# Patient Record
Sex: FEMALE | Race: WHITE
[De-identification: names, ages, dates, MRNs, and addresses within clinical notes are randomized per-mention and may not be internally consistent; named-entity substitution may affect disease eponyms.]

---

## 2017-04-19 ENCOUNTER — HOSPITAL ENCOUNTER (OUTPATIENT)
Age: 67
Discharge: HOME | End: 2017-04-19
Payer: MEDICARE

## 2017-04-19 DIAGNOSIS — R19.4: Primary | ICD-10-CM

## 2017-04-19 DIAGNOSIS — I10: ICD-10-CM

## 2017-04-19 DIAGNOSIS — D12.0: ICD-10-CM

## 2017-04-19 DIAGNOSIS — K21.9: ICD-10-CM

## 2017-04-19 PROCEDURE — 0DBH8ZX EXCISION OF CECUM, VIA NATURAL OR ARTIFICIAL OPENING ENDOSCOPIC, DIAGNOSTIC: ICD-10-PCS | Performed by: INTERNAL MEDICINE

## 2017-04-19 PROCEDURE — 45385 COLONOSCOPY W/LESION REMOVAL: CPT

## 2017-07-05 ENCOUNTER — HOSPITAL ENCOUNTER (OUTPATIENT)
Dept: HOSPITAL 76 - SDS | Age: 67
Discharge: HOME | End: 2017-07-05
Attending: INTERNAL MEDICINE
Payer: MEDICARE

## 2017-07-05 VITALS — DIASTOLIC BLOOD PRESSURE: 51 MMHG | SYSTOLIC BLOOD PRESSURE: 98 MMHG

## 2017-07-05 DIAGNOSIS — R12: ICD-10-CM

## 2017-07-05 DIAGNOSIS — Z86.010: ICD-10-CM

## 2017-07-05 DIAGNOSIS — K59.09: ICD-10-CM

## 2017-07-05 DIAGNOSIS — K44.9: ICD-10-CM

## 2017-07-05 DIAGNOSIS — I10: ICD-10-CM

## 2017-07-05 DIAGNOSIS — R13.14: Primary | ICD-10-CM

## 2017-07-05 PROCEDURE — 88305 TISSUE EXAM BY PATHOLOGIST: CPT

## 2017-07-05 PROCEDURE — 0DB68ZX EXCISION OF STOMACH, VIA NATURAL OR ARTIFICIAL OPENING ENDOSCOPIC, DIAGNOSTIC: ICD-10-PCS | Performed by: INTERNAL MEDICINE

## 2017-07-05 PROCEDURE — 43239 EGD BIOPSY SINGLE/MULTIPLE: CPT

## 2017-07-05 PROCEDURE — 0DB58ZX EXCISION OF ESOPHAGUS, VIA NATURAL OR ARTIFICIAL OPENING ENDOSCOPIC, DIAGNOSTIC: ICD-10-PCS | Performed by: INTERNAL MEDICINE

## 2018-04-19 ENCOUNTER — HOSPITAL ENCOUNTER (EMERGENCY)
Dept: HOSPITAL 76 - ED | Age: 68
Discharge: HOME | End: 2018-04-19
Payer: MEDICARE

## 2018-04-19 VITALS — DIASTOLIC BLOOD PRESSURE: 74 MMHG | SYSTOLIC BLOOD PRESSURE: 131 MMHG

## 2018-04-19 DIAGNOSIS — K59.01: Primary | ICD-10-CM

## 2018-04-19 DIAGNOSIS — I10: ICD-10-CM

## 2018-04-19 DIAGNOSIS — K21.9: ICD-10-CM

## 2018-04-19 LAB
ALBUMIN DIAFP-MCNC: 4.4 G/DL (ref 3.2–5.5)
ALBUMIN/GLOB SERPL: 1.8 {RATIO} (ref 1–2.2)
ALP SERPL-CCNC: 45 IU/L (ref 42–121)
ALT SERPL W P-5'-P-CCNC: 19 IU/L (ref 10–60)
ANION GAP SERPL CALCULATED.4IONS-SCNC: 7 MMOL/L (ref 6–13)
AST SERPL W P-5'-P-CCNC: 21 IU/L (ref 10–42)
BASOPHILS NFR BLD AUTO: 0.1 10^3/UL (ref 0–0.1)
BASOPHILS NFR BLD AUTO: 1 %
BILIRUB BLD-MCNC: 0.8 MG/DL (ref 0.2–1)
BUN SERPL-MCNC: 8 MG/DL (ref 6–20)
CALCIUM UR-MCNC: 8.9 MG/DL (ref 8.5–10.3)
CHLORIDE SERPL-SCNC: 98 MMOL/L (ref 101–111)
CLARITY UR REFRACT.AUTO: CLEAR
CO2 SERPL-SCNC: 31 MMOL/L (ref 21–32)
CREAT SERPLBLD-SCNC: 0.4 MG/DL (ref 0.4–1)
EOSINOPHIL # BLD AUTO: 0.1 10^3/UL (ref 0–0.7)
EOSINOPHIL NFR BLD AUTO: 2.4 %
ERYTHROCYTE [DISTWIDTH] IN BLOOD BY AUTOMATED COUNT: 14.3 % (ref 12–15)
GFRSERPLBLD MDRD-ARVRAT: 160 ML/MIN/{1.73_M2} (ref 89–?)
GLOBULIN SER-MCNC: 2.5 G/DL (ref 2.1–4.2)
GLUCOSE SERPL-MCNC: 95 MG/DL (ref 70–100)
GLUCOSE UR QL STRIP.AUTO: NEGATIVE MG/DL
HGB UR QL STRIP: 15.5 G/DL (ref 12–16)
KETONES UR QL STRIP.AUTO: 15 MG/DL
LIPASE SERPL-CCNC: 26 U/L (ref 22–51)
LYMPHOCYTES # SPEC AUTO: 1.7 10^3/UL (ref 1.5–3.5)
LYMPHOCYTES NFR BLD AUTO: 31.4 %
MAGNESIUM SERPL-MCNC: 2 MG/DL (ref 1.7–2.8)
MCH RBC QN AUTO: 28.7 PG (ref 27–31)
MCHC RBC AUTO-ENTMCNC: 34.6 G/DL (ref 32–36)
MCV RBC AUTO: 82.9 FL (ref 81–99)
MONOCYTES # BLD AUTO: 0.4 10^3/UL (ref 0–1)
MONOCYTES NFR BLD AUTO: 7.4 %
NEUTROPHILS # BLD AUTO: 3.1 10^3/UL (ref 1.5–6.6)
NEUTROPHILS # SNV AUTO: 5.3 X10^3/UL (ref 4.8–10.8)
NEUTROPHILS NFR BLD AUTO: 57.8 %
NITRITE UR QL STRIP.AUTO: NEGATIVE
PDW BLD AUTO: 7.4 FL (ref 7.9–10.8)
PH UR STRIP.AUTO: 7 PH (ref 5–7.5)
PLATELET # BLD: 217 10^3/UL (ref 130–450)
PROT SPEC-MCNC: 6.9 G/DL (ref 6.7–8.2)
PROT UR STRIP.AUTO-MCNC: NEGATIVE MG/DL
RBC # UR STRIP.AUTO: NEGATIVE /UL
RBC MAR: 5.4 10^6/UL (ref 4.2–5.4)
SODIUM SERPLBLD-SCNC: 136 MMOL/L (ref 135–145)
SP GR UR STRIP.AUTO: 1.01 (ref 1–1.03)
UROBILINOGEN UR QL STRIP.AUTO: (no result) E.U./DL
UROBILINOGEN UR STRIP.AUTO-MCNC: NEGATIVE MG/DL

## 2018-04-19 PROCEDURE — 83735 ASSAY OF MAGNESIUM: CPT

## 2018-04-19 PROCEDURE — 96361 HYDRATE IV INFUSION ADD-ON: CPT

## 2018-04-19 PROCEDURE — 83690 ASSAY OF LIPASE: CPT

## 2018-04-19 PROCEDURE — 99284 EMERGENCY DEPT VISIT MOD MDM: CPT

## 2018-04-19 PROCEDURE — 85651 RBC SED RATE NONAUTOMATED: CPT

## 2018-04-19 PROCEDURE — 96375 TX/PRO/DX INJ NEW DRUG ADDON: CPT

## 2018-04-19 PROCEDURE — 81003 URINALYSIS AUTO W/O SCOPE: CPT

## 2018-04-19 PROCEDURE — 85025 COMPLETE CBC W/AUTO DIFF WBC: CPT

## 2018-04-19 PROCEDURE — 99283 EMERGENCY DEPT VISIT LOW MDM: CPT

## 2018-04-19 PROCEDURE — 74177 CT ABD & PELVIS W/CONTRAST: CPT

## 2018-04-19 PROCEDURE — 81001 URINALYSIS AUTO W/SCOPE: CPT

## 2018-04-19 PROCEDURE — 96374 THER/PROPH/DIAG INJ IV PUSH: CPT

## 2018-04-19 PROCEDURE — 36415 COLL VENOUS BLD VENIPUNCTURE: CPT

## 2018-04-19 PROCEDURE — 87086 URINE CULTURE/COLONY COUNT: CPT

## 2018-04-19 PROCEDURE — 80053 COMPREHEN METABOLIC PANEL: CPT

## 2018-04-19 NOTE — ED PHYSICIAN DOCUMENTATION
PD HPI ABD PAIN





- Stated complaint


Stated Complaint: ABDOMAL PX, NAUSEA





- Chief complaint


Chief Complaint: Abd Pain





- History obtained from


History obtained from: Patient





- History of Present Illness


Timing - onset: How many days ago (few)


Timing - duration: Days (few)


Timing - details: Gradual onset, Still present, Waxing and waning


Quality: Cramping, Aching


Location: Periumbilical, LLQ


Radiation: Lower back


Improved by: No: Eating


Worsened by: Moving.  No: Eating, Palpation


Associated symptoms: Constipation (tried Mag Citrate and fleets enema twice 

yesterday without stool output. Having more cramps today. History of 

constipation. Did not call her GI.).  No: Fever, Nausea, Vomiting, Diarrhea, 

Dysuria


Similar symptoms before: Diagnosis (constipation but usually not as bad as 

current cramping pains.)





Review of Systems


Constitutional: denies: Fever, Chills


Nose: denies: Rhinorrhea / runny nose, Congestion


Throat: denies: Sore throat


Cardiac: denies: Chest pain / pressure, Palpitations


Respiratory: denies: Dyspnea, Cough


GI: reports: Abdominal Pain, Constipation.  denies: Abdominal Swelling, Nausea, 

Vomiting, Diarrhea, Bloody / black stool


: denies: Dysuria, Frequency


Skin: denies: Rash, Lesions


Neurologic: reports: Generalized weakness.  denies: Focal weakness, Numbness





PD PAST MEDICAL HISTORY





- Past Medical History


Cardiovascular: Hypertension


Respiratory: None


Neuro: None


Endocrine/Autoimmune: None


GI: GERD, Chronic constipation


GYN: None


: None


HEENT: None


Psych: None


Musculoskeletal: None


Derm: Other





- Past Surgical History


Past Surgical History: Yes


General: Colonoscopy


Ortho: Rotator cuff repair


/GYN: Tubal ligation, Hysterectomy, Other





- Present Medications


Home Medications: 


 Ambulatory Orders











 Medication  Instructions  Recorded  Confirmed


 


hydroCHLOROthiazide 25 mg PO DAILY 11/12/16 07/05/17





[Hydrochlorothiazide]   


 


Ergocalciferol [Vitamin D2] 50,000 units PO OAW 04/18/17 07/05/17


 


Lubiprostone [Amitiza] 8 mcg PO BID 04/18/17 07/05/17


 


Potassium Chloride [K-Dur] 40 meq PO DAILY 04/18/17 06/28/17


 


Meclizine [Antivert] 1 cap PO PRN PRN 04/19/17 07/05/17


 


Promethazine [Phenergan] 1 cap PO PRN PRN 04/19/17 06/28/17


 


Omeprazole 40 mg PO DAILY 06/28/17 07/05/17


 


Dicyclomine [Bentyl] 10 mg PO QID PRN #10 capsule 04/19/18 


 


Lubiprostone [Amitiza]  04/19/18 


 


raNITIdine [Zantac]  04/19/18 














- Allergies


Allergies/Adverse Reactions: 


 Allergies











Allergy/AdvReac Type Severity Reaction Status Date / Time


 


No Known Drug Allergies Allergy   Verified 06/28/17 11:25














- Social History


Does the pt smoke?: No


Smoking Status: Never smoker


Does the pt drink ETOH?: No


Does the pt have substance abuse?: No





- Family History


Family history: reports: Non contributory





- Immunizations


Immunizations are current?: Yes





PD ED PE NORMAL





- Vitals


Vital signs reviewed: Yes





- General


General: Alert and oriented X 3, No acute distress, Well developed/nourished





- Neck


Neck: Supple, no meningeal sign, No adenopathy





- Cardiac


Cardiac: RRR, No murmur





- Respiratory


Respiratory: Clear bilaterally





- Abdomen


Abdomen: Soft, Non distended, No organomegaly, Other (mild tender left abdomen 

without percussion nor rebound. ).  No: Normal bowel sounds (increased and 

hyperactive)





- Female 


Female : Deferred





- Rectal


Rectal: Deferred





- Back


Back: No CVA TTP





- Derm


Derm: Normal color, Warm and dry





Results





- Vitals


Vitals: 


 Vital Signs - 24 hr











  04/19/18 04/19/18





  11:17 15:11


 


Temperature 36.6 C 


 


Heart Rate 73 67


 


Respiratory 17 16





Rate  


 


Blood Pressure 150/63 H 131/74 H


 


O2 Saturation 99 99








 Oxygen











O2 Source                      Room air

















- Labs


Labs: 


 Laboratory Tests











  04/19/18 04/19/18 04/19/18





  12:35 12:35 12:50


 


WBC  5.3  


 


RBC  5.40  


 


Hgb  15.5  


 


Hct  44.8  


 


MCV  82.9  


 


MCH  28.7  


 


MCHC  34.6  


 


RDW  14.3  


 


Plt Count  217  


 


MPV  7.4 L  


 


Neut #  3.1  


 


Lymph #  1.7  


 


Mono #  0.4  


 


Eos #  0.1  


 


Baso #  0.1  


 


Absolute Nucleated RBC  0.00  


 


Nucleated RBC %  0.0  


 


ESR   1 


 


Sodium   


 


Potassium   


 


Chloride   


 


Carbon Dioxide   


 


Anion Gap   


 


BUN   


 


Creatinine   


 


Estimated GFR (MDRD)   


 


Glucose   


 


Calcium   


 


Magnesium   


 


Total Bilirubin   


 


AST   


 


ALT   


 


Alkaline Phosphatase   


 


Total Protein   


 


Albumin   


 


Globulin   


 


Albumin/Globulin Ratio   


 


Lipase   


 


Urine Color    YELLOW


 


Urine Clarity    CLEAR


 


Urine pH    7.0


 


Ur Specific Gravity    1.010


 


Urine Protein    NEGATIVE


 


Urine Glucose (UA)    NEGATIVE


 


Urine Ketones    15 H


 


Urine Occult Blood    NEGATIVE


 


Urine Nitrite    NEGATIVE


 


Urine Bilirubin    NEGATIVE


 


Urine Urobilinogen    0.2 (NORMAL)


 


Ur Leukocyte Esterase    NEGATIVE


 


Ur Microscopic Review    NOT INDICATED


 


Urine Culture Comments    NOT INDICATED














  04/19/18





  13:09


 


WBC 


 


RBC 


 


Hgb 


 


Hct 


 


MCV 


 


MCH 


 


MCHC 


 


RDW 


 


Plt Count 


 


MPV 


 


Neut # 


 


Lymph # 


 


Mono # 


 


Eos # 


 


Baso # 


 


Absolute Nucleated RBC 


 


Nucleated RBC % 


 


ESR 


 


Sodium  136


 


Potassium  3.5


 


Chloride  98 L


 


Carbon Dioxide  31


 


Anion Gap  7.0


 


BUN  8


 


Creatinine  0.4


 


Estimated GFR (MDRD)  160


 


Glucose  95


 


Calcium  8.9


 


Magnesium  2.0


 


Total Bilirubin  0.8


 


AST  21


 


ALT  19


 


Alkaline Phosphatase  45


 


Total Protein  6.9


 


Albumin  4.4


 


Globulin  2.5


 


Albumin/Globulin Ratio  1.8


 


Lipase  26


 


Urine Color 


 


Urine Clarity 


 


Urine pH 


 


Ur Specific Gravity 


 


Urine Protein 


 


Urine Glucose (UA) 


 


Urine Ketones 


 


Urine Occult Blood 


 


Urine Nitrite 


 


Urine Bilirubin 


 


Urine Urobilinogen 


 


Ur Leukocyte Esterase 


 


Ur Microscopic Review 


 


Urine Culture Comments 














- Rads (name of study)


  ** abd CT


Radiology: Prelim report reviewed (stable findings of ovarian cyst, gallstone, 

varicosities. No acute findings. ), EMP read contemporaneously





PD MEDICAL DECISION MAKING





- ED course


Complexity details: considered differential (could be constipation, but concern 

for colitis, partial obstruction, UTI, rupturing cyst (has history of stable 

large ovarian cyst), vascular. Will get labs and CT. ), d/w patient





Departure





- Departure


Disposition: 01 Home, Self Care


Clinical Impression: 


 Constipation by delayed colonic transit





Abdominal pain


Qualifiers:


 Abdominal location: lower abdomen, unspecified Qualified Code(s): R10.30 - 

Lower abdominal pain, unspecified





Condition: Stable


Record reviewed to determine appropriate education?: Yes


Instructions:  ED Abdominal Pain Unkn Cause, ED Constipation


Follow-Up: 


Roman Mcmillan DO [Primary Care Provider] - 


Dontae Silva MD [Provider Admit Priv/Credential] - 


Prescriptions: 


Dicyclomine [Bentyl] 10 mg PO QID PRN #10 capsule


 PRN Reason: Spasms


Comments: 


Drink lots of fluids.  Usual medications.  I would suggest using your MiraLAX 

at home 1 dose (17 g in a glass of water) every hour until he started having 

loose bowel movements.  If still not having bowel movement into tomorrow then 

contact your GI specialist regarding other advice.  Your scans and blood tests 

appear okay without signs of other cause of the abdominal pain at this point.He 

can use some naproxen or ibuprofen 2-3 times a day to help with pain and 

cramps.  If need be you can add dicyclomine antispasmodic for worse pains and 

cramps.


Discharge Date/Time: 04/19/18 15:48

## 2018-04-19 NOTE — CT PRELIMINARY REPORT
Accession: [id number]

Exam: CT ABDOMEN/PELVIS W/

 

IMPRESSION: 

1. Old minimal wedging thoracolumbar junction. 

2. Cholelithiasis. 

3. Suspect extensive right leg varicosities. 

4. Stable 3.8 cm left ovarian cyst. 

5. Colonic diverticulosis. 

6. No radiographic explanation for this lady's presenting symptoms.

 

[facility]

 

SITE ID: [id number]

## 2018-04-19 NOTE — CT REPORT
EXAM:

CT ABDOMEN AND PELVIS

 

EXAM DATE: 4/19/2018 02:35 PM.

 

CLINICAL HISTORY: Constipation, nausea, lower back pain greatest on the left, lower abdominal crampin
g and bloating for several days.

 

COMPARISONS: 11/12/2016.

 

TECHNIQUE: Routine helical CT imaging was performed through the abdomen and pelvis. IV contrast: ISOV
  100 mL. Enteric contrast: No. Reconstructions: Coronal and sagittal.

 

In accordance with CT protocol optimization, one or more of the following dose reduction techniques w
ere utilized for this exam: automated exposure control, adjustment of mA and/or KV based on patient s
ize, or use of iterative reconstructive technique.

 

FINDINGS: 

Lung Bases: Unremarkable.

 

Liver: Normal. No masses.

 

Gallbladder/Bile Ducts: 2.5 cm gallstone within the central aspect of the normal caliber gallbladder 
lumen. No biliary duct dilatation.

 

Spleen: Normal.

 

Pancreas: Normal.

 

Adrenal Glands: Normal.

 

Kidneys: Normal. No masses or hydronephrosis.

 

Peritoneal Cavity/Bowel: Diverticula off the colon. No free fluid, free air or adenopathy. No masses 
or acute inflammatory process. The appendix is well visualized and normal.

 

Pelvic Organs: 

Hysterectomy.

Stable thin-walled water density lesion left ovary measuring 3.9 x 3.6 x 3.2 cm.

No free fluid. No right adnexal finding. No stones in the small caliber urinary bladder.

 

Vasculature: Enlarged tortuous right greater saphenous vein unchanged, consistent with extensive righ
t lower leg varicosities. No aneurysms or other significant abnormality.

 

Bones: Old mild wedging T12 and L1.

 

Other: None.

 

IMPRESSION: 

1. Old minimal wedging thoracolumbar junction. 

2. Cholelithiasis. 

3. Suspect extensive right leg varicosities. 

4. Stable 3.8 cm left ovarian cyst. 

5. Colonic diverticulosis. 

6. No radiographic explanation for this lady's presenting symptoms.

 

[facility]

Referring Provider Line: [phone]

 

SITE ID: [id number]

## 2018-05-22 ENCOUNTER — HOSPITAL ENCOUNTER (OUTPATIENT)
Dept: HOSPITAL 76 - SDS | Age: 68
LOS: 1 days | Discharge: HOME | End: 2018-05-23
Attending: SURGERY
Payer: MEDICARE

## 2018-05-22 VITALS — DIASTOLIC BLOOD PRESSURE: 70 MMHG | SYSTOLIC BLOOD PRESSURE: 124 MMHG

## 2018-05-22 DIAGNOSIS — R11.0: ICD-10-CM

## 2018-05-22 DIAGNOSIS — I10: ICD-10-CM

## 2018-05-22 DIAGNOSIS — K40.90: Primary | ICD-10-CM

## 2018-05-22 DIAGNOSIS — K21.9: ICD-10-CM

## 2018-05-22 DIAGNOSIS — E78.5: ICD-10-CM

## 2018-05-22 PROCEDURE — 49505 PRP I/HERN INIT REDUC >5 YR: CPT

## 2018-05-22 PROCEDURE — 0YU50JZ SUPPLEMENT RIGHT INGUINAL REGION WITH SYNTHETIC SUBSTITUTE, OPEN APPROACH: ICD-10-PCS | Performed by: SURGERY

## 2018-05-22 RX ADMIN — NALBUPHINE HYDROCHLORIDE ONE MG: 20 INJECTION, SOLUTION INTRAMUSCULAR; INTRAVENOUS; SUBCUTANEOUS at 14:48

## 2018-05-22 RX ADMIN — HYDROMORPHONE HYDROCHLORIDE PRN MG: 1 INJECTION, SOLUTION INTRAMUSCULAR; INTRAVENOUS; SUBCUTANEOUS at 17:38

## 2018-05-22 RX ADMIN — HYDROMORPHONE HYDROCHLORIDE ONE MG: 1 INJECTION, SOLUTION INTRAMUSCULAR; INTRAVENOUS; SUBCUTANEOUS at 11:33

## 2018-05-22 RX ADMIN — HYDROMORPHONE HYDROCHLORIDE ONE MG: 1 INJECTION, SOLUTION INTRAMUSCULAR; INTRAVENOUS; SUBCUTANEOUS at 12:02

## 2018-05-22 RX ADMIN — SODIUM CHLORIDE, POTASSIUM CHLORIDE, SODIUM LACTATE AND CALCIUM CHLORIDE SCH MLS/HR: 600; 310; 30; 20 INJECTION, SOLUTION INTRAVENOUS at 21:16

## 2018-05-22 RX ADMIN — ONDANSETRON PRN MG: 2 INJECTION INTRAMUSCULAR; INTRAVENOUS at 17:39

## 2018-05-22 RX ADMIN — NALBUPHINE HYDROCHLORIDE ONE MG: 20 INJECTION, SOLUTION INTRAMUSCULAR; INTRAVENOUS; SUBCUTANEOUS at 15:05

## 2018-05-22 RX ADMIN — HYDROMORPHONE HYDROCHLORIDE PRN MG: 1 INJECTION, SOLUTION INTRAMUSCULAR; INTRAVENOUS; SUBCUTANEOUS at 21:16

## 2018-05-22 NOTE — OPERATIVE REPORT
Operative Report





- General


Procedure Date: 05/22/18


Planned Procedure: RIGHT inguinal herniorrhaphy


Pre-Op Diagnosis: RIGHT inguinal hernia


Procedure Performed: 


RIGHT direct inguinal herniorrhaphy with mesh


Post Op Diagnosis: RIGHT direct inguinal hernia





- Procedure Note


Primary Surgeon: Rodney Fay MD


Anesthesia Provider: Alessio Garcia CRNA


Anesthesia Technique: General LMA, Local (30 mL 1/2% marcaine)


IV Fluids (mL): 500


Estimated Blood Loss (mL): 5


Complications: 


None.





- Other


Other Information/Narrative: 


OPERATIVE DESCRIPTION/REPORT:





After verbal and written informed consent was obtained detailing the risks of 

infection, bleeding requiring transfusion with its risks, nerve injury, and 

death, and after I met with the patient confirming the surgery and the site of 

the surgery and after initialing the site of the surgery with a surgical marker

, the patient was brought to the operative suite and placed supine on the 

operating table.  Great care was taken to avoid pressure points to prevent 

pressure necrosis or nerve injury.  Monitoring devices were applied along with 

TEDs and pneumatic compressive stockings (to prevent DVT). The patient received 

preoperative antibiotics for surgical prophylaxis.  Alessio Garcia sedated and 

anesthetized the patient for the entire procedure.   The patient was prepped 

and draped in the usual sterile manner.  With the patient draped my initials 

were clearly visible.  A "time in" then confirmed that the patient was 

identified with 3 identifiers (name, birth date and medical record number), the 

history and physical was in the chart, the signed consent confirming the 

procedure was in the chart, the patient was in the correct position, the 

aforementioned prophylactic measures were in place or given, we had the correct 

personnel and equipment to complete the procedure and that anesthesia, surgery 

and nursing were given an opportunity to express any concerns.  With the 

agreement of everyone in the room, we proceeded with the operation.





A standard inguinal incision was made and dissection was carried down to the 

external oblique aponeurosis using a combination of Metzenbaum scissors and 

Bovie electrocautery.  The external oblique aponeurosis was cleared of 

overlying adherent tissue, and the external ring was delineated.  The external 

oblique was the incised with a scalpel and this incision was carried out to the 

external ring using Metzenbaum scissors.  Having exposed the inguinal canal, 

the cord structures were  from the canal using blunt dissection, and 

the round ligament was transected in order to get it out of my way.





No indirect inguinal hernia was found despite extensive investigation.  The 

hernia was found coming from the floor of the inguinal canal medial to the 

inferior epigastric vessels.  This was dissected back to the hernia opening. 

The hernia was inverted back into the abdominal cavity and an extra large Bard 

Perfix plug (Ref# [id number], Lot# [id number], use by date 2023-01-28) inserted into 

the hernia defect.  The plug was secured to the edge of the hernia defect using 

interrupted 2-0 PDS sutures.  This permitted the floor of the inguinal canal to 

be repaired without the hernia in my way.





The Perfix enlay patch was then placed on the floor of the inguinal canal and 

secured superiorly to the conjoined tendon and inferiorly to the shelving edge 

of Pouparts ligament using interrupted 2-0 PDS sutures. At the pubic tubercle 

a 2-0 PDS stitch was used to secure the mesh.  The mesh was secured over the 

internal ring.





The wound was then irrigated using sterile saline, and hemostasis was obtained 

using Bovie electrocautery.  The incision in the external oblique was 

approximated using a 2-0 Vicryl in a running fashion, thus reforming the 

external ring.  The skin incision was approximated with 4-0 Monocryl in a 

subcuticular fashion.  The skin was prepped with benzoin and steristrips were 

applied. At this point a time out was performed that confirmed that all the 

counts were correct, the procedure that was performed, the blood loss, the IV 

fluids administered, and the patients condition. A dressing was then applied.  

Having tolerated the procedure well, the patient was taken to recovery room in 

good and stable condition.

## 2018-05-23 RX ADMIN — SODIUM CHLORIDE, POTASSIUM CHLORIDE, SODIUM LACTATE AND CALCIUM CHLORIDE SCH: 600; 310; 30; 20 INJECTION, SOLUTION INTRAVENOUS at 09:27

## 2018-05-23 RX ADMIN — OXYCODONE AND ACETAMINOPHEN PRN TAB: 5; 325 TABLET ORAL at 07:51

## 2018-05-23 RX ADMIN — ONDANSETRON PRN MG: 2 INJECTION INTRAMUSCULAR; INTRAVENOUS at 00:51

## 2018-05-23 RX ADMIN — OXYCODONE AND ACETAMINOPHEN PRN TAB: 5; 325 TABLET ORAL at 04:01

## 2018-05-23 RX ADMIN — HYDROMORPHONE HYDROCHLORIDE PRN MG: 1 INJECTION, SOLUTION INTRAMUSCULAR; INTRAVENOUS; SUBCUTANEOUS at 00:51

## 2018-07-24 ENCOUNTER — HOSPITAL ENCOUNTER (EMERGENCY)
Dept: HOSPITAL 76 - ED | Age: 68
Discharge: LEFT BEFORE DISCHARGE | End: 2018-07-24
Payer: MEDICARE

## 2018-07-24 VITALS — SYSTOLIC BLOOD PRESSURE: 153 MMHG | DIASTOLIC BLOOD PRESSURE: 83 MMHG

## 2018-07-24 DIAGNOSIS — Z53.21: Primary | ICD-10-CM

## 2019-09-30 ENCOUNTER — HOSPITAL ENCOUNTER (OUTPATIENT)
Dept: HOSPITAL 76 - EMS | Age: 69
Discharge: ANOTHER HOSPITAL | End: 2019-09-30
Attending: SURGERY
Payer: MEDICARE

## 2019-09-30 ENCOUNTER — HOSPITAL ENCOUNTER (EMERGENCY)
Dept: HOSPITAL 76 - ED | Age: 69
Discharge: HOME | End: 2019-09-30
Payer: MEDICARE

## 2019-09-30 VITALS — SYSTOLIC BLOOD PRESSURE: 132 MMHG | DIASTOLIC BLOOD PRESSURE: 60 MMHG

## 2019-09-30 DIAGNOSIS — R11.2: ICD-10-CM

## 2019-09-30 DIAGNOSIS — R11.0: ICD-10-CM

## 2019-09-30 DIAGNOSIS — R42: Primary | ICD-10-CM

## 2019-09-30 DIAGNOSIS — E87.6: ICD-10-CM

## 2019-09-30 DIAGNOSIS — I10: ICD-10-CM

## 2019-09-30 LAB
ALBUMIN DIAFP-MCNC: 4.6 G/DL (ref 3.2–5.5)
ALBUMIN/GLOB SERPL: 1.7 {RATIO} (ref 1–2.2)
ALP SERPL-CCNC: 48 IU/L (ref 42–121)
ALT SERPL W P-5'-P-CCNC: 16 IU/L (ref 10–60)
ANION GAP SERPL CALCULATED.4IONS-SCNC: 14 MMOL/L (ref 6–13)
AST SERPL W P-5'-P-CCNC: 24 IU/L (ref 10–42)
BASOPHILS NFR BLD AUTO: 0.1 10^3/UL (ref 0–0.1)
BASOPHILS NFR BLD AUTO: 0.5 %
BILIRUB BLD-MCNC: 0.7 MG/DL (ref 0.2–1)
BUN SERPL-MCNC: 10 MG/DL (ref 6–20)
CALCIUM UR-MCNC: 9.3 MG/DL (ref 8.5–10.3)
CHLORIDE SERPL-SCNC: 100 MMOL/L (ref 101–111)
CLARITY UR REFRACT.AUTO: CLEAR
CO2 SERPL-SCNC: 25 MMOL/L (ref 21–32)
CREAT SERPLBLD-SCNC: 0.6 MG/DL (ref 0.4–1)
EOSINOPHIL # BLD AUTO: 0.1 10^3/UL (ref 0–0.7)
EOSINOPHIL NFR BLD AUTO: 0.5 %
ERYTHROCYTE [DISTWIDTH] IN BLOOD BY AUTOMATED COUNT: 13.4 % (ref 12–15)
GFRSERPLBLD MDRD-ARVRAT: 99 ML/MIN/{1.73_M2} (ref 89–?)
GLOBULIN SER-MCNC: 2.7 G/DL (ref 2.1–4.2)
GLUCOSE SERPL-MCNC: 182 MG/DL (ref 70–100)
GLUCOSE UR QL STRIP.AUTO: NEGATIVE MG/DL
HGB UR QL STRIP: 14.9 G/DL (ref 12–16)
KETONES UR QL STRIP.AUTO: 40 MG/DL
LIPASE SERPL-CCNC: 30 U/L (ref 22–51)
LYMPHOCYTES # SPEC AUTO: 1.3 10^3/UL (ref 1.5–3.5)
LYMPHOCYTES NFR BLD AUTO: 13.1 %
MCH RBC QN AUTO: 28.9 PG (ref 27–31)
MCHC RBC AUTO-ENTMCNC: 33.9 G/DL (ref 32–36)
MCV RBC AUTO: 85.3 FL (ref 81–99)
MONOCYTES # BLD AUTO: 0.3 10^3/UL (ref 0–1)
MONOCYTES NFR BLD AUTO: 3.4 %
NEUTROPHILS # BLD AUTO: 8.2 10^3/UL (ref 1.5–6.6)
NEUTROPHILS # SNV AUTO: 10 X10^3/UL (ref 4.8–10.8)
NEUTROPHILS NFR BLD AUTO: 82 %
NITRITE UR QL STRIP.AUTO: NEGATIVE
PDW BLD AUTO: 9.3 FL (ref 7.9–10.8)
PH UR STRIP.AUTO: 6.5 PH (ref 5–7.5)
PLATELET # BLD: 261 10^3/UL (ref 130–450)
PROT SPEC-MCNC: 7.3 G/DL (ref 6.7–8.2)
PROT UR STRIP.AUTO-MCNC: NEGATIVE MG/DL
RBC # UR STRIP.AUTO: NEGATIVE /UL
RBC MAR: 5.16 10^6/UL (ref 4.2–5.4)
SODIUM SERPLBLD-SCNC: 139 MMOL/L (ref 135–145)
SP GR UR STRIP.AUTO: 1.01 (ref 1–1.03)
UROBILINOGEN UR QL STRIP.AUTO: (no result) E.U./DL
UROBILINOGEN UR STRIP.AUTO-MCNC: NEGATIVE MG/DL

## 2019-09-30 PROCEDURE — 81003 URINALYSIS AUTO W/O SCOPE: CPT

## 2019-09-30 PROCEDURE — 96375 TX/PRO/DX INJ NEW DRUG ADDON: CPT

## 2019-09-30 PROCEDURE — 70450 CT HEAD/BRAIN W/O DYE: CPT

## 2019-09-30 PROCEDURE — 83690 ASSAY OF LIPASE: CPT

## 2019-09-30 PROCEDURE — 81001 URINALYSIS AUTO W/SCOPE: CPT

## 2019-09-30 PROCEDURE — 80053 COMPREHEN METABOLIC PANEL: CPT

## 2019-09-30 PROCEDURE — 99284 EMERGENCY DEPT VISIT MOD MDM: CPT

## 2019-09-30 PROCEDURE — 93005 ELECTROCARDIOGRAM TRACING: CPT

## 2019-09-30 PROCEDURE — 87086 URINE CULTURE/COLONY COUNT: CPT

## 2019-09-30 PROCEDURE — 36415 COLL VENOUS BLD VENIPUNCTURE: CPT

## 2019-09-30 PROCEDURE — 85025 COMPLETE CBC W/AUTO DIFF WBC: CPT

## 2019-09-30 PROCEDURE — 96365 THER/PROPH/DIAG IV INF INIT: CPT

## 2019-09-30 NOTE — ED PHYSICIAN DOCUMENTATION
History of Present Illness





- Stated complaint


Stated Complaint: LIGHTHEADED, NAUSEA





- Chief complaint


Chief Complaint: Abd Pain





- History obtained from


History obtained from: Patient, Family





- History of Present Illness


Timing: Today


Pain level max: 0


Pain level now: 0





- Additonal information


Additional information: 





69-year-old female presents the emergency department stating that she was with 

her sister who is having "end-of-life issues".  Today she started to feel shaky 

and then nauseated and feels similar to her vertigo in the past without the 

vertiginous symptoms of dizziness.  No history of anxiety that she knows of.  

Nothing makes it better or worse.





Review of Systems


Ten Systems: 10 systems reviewed and negative


Constitutional: denies: Fever, Chills


Cardiac: denies: Chest pain / pressure


Respiratory: denies: Cough


Skin: denies: Rash


Musculoskeletal: denies: Neck pain, Back pain


Neurologic: reports: Numbness (states has intermittent tingling to the LLE for 

the past week or two.).  denies: Focal weakness, Confused, Altered mental 

status, Headache





PD PAST MEDICAL HISTORY





- Past Medical History


Cardiovascular: Hypertension, High cholesterol


Respiratory: None


Endocrine/Autoimmune: None


GI: GERD, Chronic diarrhea, Chronic constipation, Cholelithiasis


GYN: None


: None


HEENT: Other


Psych: Depression


Musculoskeletal: None


Derm: Other





- Past Surgical History


Past Surgical History: Yes


General: Colonoscopy, EGD


Ortho: Rotator cuff repair


/GYN: Tubal ligation, Hysterectomy, Oophrectomy, Other





- Present Medications


Home Medications: 


                                Ambulatory Orders











 Medication  Instructions  Recorded  Confirmed


 


hydroCHLOROthiazide 12.5 mg PO DAILY 11/12/16 05/22/18





[Hydrochlorothiazide]   


 


Ergocalciferol [Vitamin D2] 50,000 units PO OAW 04/18/17 05/22/18


 


Potassium Chloride [K-Dur] 20 meq PO DAILY 04/18/17 05/22/18


 


Calcium Carbonate/Vitamin D3 1 each PO DAILY 05/21/18 05/22/18





[Calcium 600-Vit D3 800 Tablet]   


 


Cyanocobalamin (Vitamin B-12) 0 mcg PO DAILY 05/21/18 05/22/18





[Vitamin B-12]   


 


Linaclotide [Linzess] 290 mcg PO DAILY 05/21/18 05/22/18


 


Polyethylene Glycol 3350 [Miralax] 17 gm PO DAILY 05/21/18 05/22/18


 


Pantoprazole Sodium 40 mg PO DAILY 05/22/18 05/22/18


 


Meclizine [Antivert] 25 mg PO Q6H PRN #30 tablet 09/30/19 


 


Ondansetron Odt [Zofran] 4 mg TL Q6H PRN #10 tablet 09/30/19 














- Allergies


Allergies/Adverse Reactions: 


                                    Allergies











Allergy/AdvReac Type Severity Reaction Status Date / Time


 


No Known Drug Allergies Allergy   Verified 09/30/19 16:45














- Social History


Does the pt smoke?: No


Smoking Status: Never smoker


Does the pt drink ETOH?: No


Does the pt have substance abuse?: No





- Immunizations


Immunizations are current?: Yes





PD ED PE NORMAL





- Vitals


Vital signs reviewed: Yes





- General


General: Alert and oriented X 3, Other (eyes closed, holding an emesis bag)





- HEENT


HEENT: Atraumatic, PERRL, Ears normal, Pharynx benign, Other (horizontal 

nystagmus to the L, +hallpike)





- Neck


Neck: Supple, no meningeal sign, No bony TTP





- Cardiac


Cardiac: RRR, Strong equal pulses





- Respiratory


Respiratory: No respiratory distress, Clear bilaterally





- Abdomen


Abdomen: Soft, Non tender, Non distended





- Derm


Derm: Warm and dry, No rash





- Extremities


Extremities: No calf tenderness / cord





- Neuro


Neuro: Alert and oriented X 3, CNs 2-12 intact, No motor deficit, No sensory 

deficit, Normal speech


Eye Opening: Spontaneous


Motor: Obeys Commands


Verbal: Oriented


GCS Score: 15





- Psych


Psych: Normal mood, Normal affect





Results





- Vitals


Vitals: 


                                     Oxygen











O2 Source                      Room air

















- EKG (time done)


  ** 1722


Rate: Rate (enter#) (63)


Rhythm: NSR


Axis: Normal


Intervals: Normal [place]


QRS: Normal


Ischemia: Normal ST segments





- Labs


Labs: 


                                Laboratory Tests











  09/30/19 09/30/19 09/30/19





  17:15 17:15 19:30


 


WBC  10.0  


 


RBC  5.16  


 


Hgb  14.9  


 


Hct  44.0  


 


MCV  85.3  


 


MCH  28.9  


 


MCHC  33.9  


 


RDW  13.4  


 


Plt Count  261  


 


MPV  9.3  


 


Neut # (Auto)  8.2 H  


 


Lymph # (Auto)  1.3 L  


 


Mono # (Auto)  0.3  


 


Eos # (Auto)  0.1  


 


Baso # (Auto)  0.1  


 


Absolute Nucleated RBC  0.00  


 


Nucleated RBC %  0.0  


 


Sodium   139 


 


Potassium   2.9 L 


 


Chloride   100 L 


 


Carbon Dioxide   25 


 


Anion Gap   14.0 H 


 


BUN   10 


 


Creatinine   0.6 


 


Estimated GFR (MDRD)   99 


 


Glucose   182 H 


 


Calcium   9.3 


 


Total Bilirubin   0.7 


 


AST   24 


 


ALT   16 


 


Alkaline Phosphatase   48 


 


Total Protein   7.3 


 


Albumin   4.6 


 


Globulin   2.7 


 


Albumin/Globulin Ratio   1.7 


 


Lipase   30 


 


Urine Color    YELLOW


 


Urine Clarity    CLEAR


 


Urine pH    6.5


 


Ur Specific Gravity    1.015


 


Urine Protein    NEGATIVE


 


Urine Glucose (UA)    NEGATIVE


 


Urine Ketones    40 H


 


Urine Occult Blood    NEGATIVE


 


Urine Nitrite    NEGATIVE


 


Urine Bilirubin    NEGATIVE


 


Urine Urobilinogen    0.2 (NORMAL)


 


Ur Leukocyte Esterase    NEGATIVE


 


Ur Microscopic Review    NOT INDICATED


 


Urine Culture Comments    NOT INDICATED














- Rads (name of study)


  ** head CT


Radiology: Prelim report reviewed, EMP read contemporaneously, See rad report 

(no acute intracranial abnormality)





PD MEDICAL DECISION MAKING





- ED course


Complexity details: reviewed results, re-evaluated patient, considered 

differential, d/w patient


ED course: 





Patient with vomiting today.  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 

disease.  She feels better after meclizine, Ativan, Phenergan.  Tolerating p.o. 

without difficulty.  Ambulating well.  No focal neurological deficits.  No 

evidence of stroke, tumor.  She is hypokalemic and this was replaced.  She will 

follow-up with her doctor for further care.  Patient counseled regarding signs 

and symptoms for which I believe and urgent re-evaluation would be necessary. 

Patient with good understanding of and agreement to plan and is comfortable 

going home at this time





This document was made in part using voice recognition software. While efforts 

are made to proofread this document, sound alike and grammatical errors may 

occur.





Departure





- Departure


Disposition: 01 Home, Self Care


Clinical Impression: 


 Vertigo, Hypokalemia





Vomiting


Qualifiers:


 Vomiting type: unspecified Vomiting Intractability: non-intractable Nausea 

presence: with nausea Qualified Code(s): R11.2 - Nausea with vomiting, 

unspecified





Condition: Good


Instructions:  ED Potassium Deficiency, ED Vertigo Unspecified


Follow-Up: 


Marlen Horn MD [Primary Care Provider] - Within 1 week


Prescriptions: 


Meclizine [Antivert] 25 mg PO Q6H PRN #30 tablet


 PRN Reason: Vertigo


Ondansetron Odt [Zofran] 4 mg TL Q6H PRN #10 tablet


 PRN Reason: Nausea / Vomiting


Comments: 


You should have your potassium rechecked with your doctor in 3 to 4 days.  

Return if you worsen.  Remove the scopolamine patch after 3 days.


Discharge Date/Time: 09/30/19 22:11

## 2019-09-30 NOTE — CT REPORT
Reason:  dizzy, vomiting,

Procedure Date:  09/30/2019   

Accession Number:  [id number] / [id number]                    

Procedure:  CT  - HEAD WO CPT Code:  

 

FULL RESULT:

 

 

EXAM:

CT HEAD

 

EXAM DATE: 9/30/2019 08:05 PM.

 

CLINICAL HISTORY: Dizzy, vomiting.

 

COMPARISON: HEAD W/O 10/07/2015 3:18 PM.

 

TECHNIQUE: Multiaxial CT images were obtained from the foramen magnum to 

the vertex. Reformats: Sagittal and coronal. IV contrast: None.

 

In accordance with CT protocol optimization, one or more of the following 

dose reduction techniques were utilized for this exam: automated exposure 

control, adjustment of mA and/or KV based on patient size, or use of 

iterative reconstructive technique.

 

FINDINGS:

Parenchyma: No intraparenchymal hemorrhage. No evidence of mass, midline 

shift, or CT findings of infarction. Gray-white differentiation is 

distinct.

 

Extraaxial Spaces: Normal for age. No subdural or epidural collections 

identified.

 

Ventricles: Normal in size and position.

 

Sinuses and Orbits: Imaged paranasal sinuses, orbits, and mastoids show 

no significant abnormality.

 

Bones: No evidence of fracture or calvarial defect.

 

Other: None.

IMPRESSION: Stable negative head CT.

 

[facility]

## 2019-12-11 ENCOUNTER — HOSPITAL ENCOUNTER (OUTPATIENT)
Dept: HOSPITAL 76 - DI | Age: 69
Discharge: HOME | End: 2019-12-11
Attending: FAMILY MEDICINE
Payer: MEDICARE

## 2019-12-11 DIAGNOSIS — R42: Primary | ICD-10-CM

## 2019-12-11 PROCEDURE — 70553 MRI BRAIN STEM W/O & W/DYE: CPT

## 2019-12-12 NOTE — MRI REPORT
Reason:  DIZZINESS, NEUROLOGICAL CHANGES

Procedure Date:  12/11/2019   

Accession Number:  [id number] / [id number]                    

Procedure:  MRI - Brain W/WO CPT Code:  

 

***Final Report***

 

 

FULL RESULT:

 

 

EXAM:

MRI BRAIN WITHOUT AND WITH CONTRAST

 

EXAM DATE: 12/11/2019 06:10 PM.

 

CLINICAL HISTORY: DIZZINESS, NEUROLOGICAL CHANGES.

 

COMPARISON: BRAIN W/WO 08/20/2015 1:22 PM

HEAD W/O 09/30/2019 8:03 PM

BRAIN/IACS W/WO 11/16/2015 1:23 PM.

 

TECHNIQUE: Multiplanar, multisequence T1-weighted and fluid-sensitive MR 

sequences of the brain were performed before and after administration of 

intravenous contrast. Sequences optimized for routine evaluation. Other: 

None. IV Contrast: Yes, without and with 10 mL Gadavist.

 

FINDINGS:

 

Parenchyma: No acute hemorrhage, mass, or infarct. No white matter 

lesions identified. No abnormal enhancement.

 

Ventricles/Cisterns: Mild enlargement of lateral ventricles. Diffuse 

prominence of sulci. No abnormal extra-axial fluid collection or 

hemorrhage.

 

Orbits: Symmetric and unremarkable.

 

Sella Turcica: Unremarkable.

 

IAC: Symmetric and unremarkable.

 

Vasculature: Normal signal flow void is seen in the major arterial 

structures at the skull base. The dural sinuses are patent and enhance 

normally.

 

Sinuses: Well aerated.

 

Bones: No focal pathologic appearing marrow signal changes.

 

Other: None.

IMPRESSION:

1. No evidence of mass, infarct, or other acute abnormality.

2. Unchanged brain MRI with mild diffuse volume loss. No new abnormality.

 

RADIA

## 2020-10-25 ENCOUNTER — HOSPITAL ENCOUNTER (OUTPATIENT)
Dept: HOSPITAL 76 - DI.S | Age: 70
Discharge: HOME | End: 2020-10-25
Attending: EMERGENCY MEDICINE
Payer: MEDICARE

## 2020-10-25 DIAGNOSIS — S60.222A: Primary | ICD-10-CM

## 2020-10-25 NOTE — XRAY REPORT
PROCEDURE:  Hand 3 View LT

 

INDICATIONS:  CONTUSION OF LEFT HAND

 

TECHNIQUE:  3 views of the hand(s) acquired.  

 

COMPARISON:  None

 

FINDINGS:  

 

Bones:  No fractures or dislocations. Moderate degenerative changes present at the first CMC joint. N
o suspicious bony lesions.  

 

Soft tissues:  No suspicious soft tissue calcifications.  

 

IMPRESSION:  

No acute fracture or dislocation. If pain persists, consider repeat imaging in 5-7 days to exclude oc
cult fracture.

 

Reviewed by: Phyllis Spain MD on 10/25/2020 1:50 PM PDT

Approved by: Phyllis Spain MD on 10/25/2020 1:50 PM PDT

 

 

Station ID:  [id number]

## 2020-11-23 ENCOUNTER — HOSPITAL ENCOUNTER (OUTPATIENT)
Dept: HOSPITAL 76 - DI.N | Age: 70
Discharge: HOME | End: 2020-11-23
Attending: ORTHOPAEDIC SURGERY
Payer: MEDICARE

## 2020-11-23 DIAGNOSIS — M79.642: Primary | ICD-10-CM

## 2020-11-23 NOTE — XRAY REPORT
PROCEDURE:  Hand 3 View LT

 

INDICATIONS:  L HAND PX

 

TECHNIQUE:  3 views of the hand(s) acquired.  

 

COMPARISON:  X-ray hand 10/25/2020

 

FINDINGS:  

 

Bones: As identified on prior exam, there are several areas of punctate calcifications at the base of
 the distal third phalanx. These appear old. There is a very ill-defined nondisplaced lucency at the 
base of the fifth metacarpal. It is not seen on all images. In addition, there is a cortical irregula
rity at the base of the fourth metacarpal. No suspicious bony lesions.  

 

Soft tissues:  No suspicious soft tissue calcifications.  

 

IMPRESSION:  

 

1. Ill-defined nondisplaced lucency seen only on one image at the base of the fifth metacarpal. Fract
ure in this region cannot be definitively excluded.

 

 

2. Ill-defined cortical irregularity at the base of the fourth metacarpal. Fracture cannot be exclude
d. Short interval imaging follow up if pain is present at the fourth or fifth metacarpal base in 10 d
ays for continued evaluation. As clinically indicated, CT may be obtained.

 

Reviewed by: Nicolasa Carrero MD on 11/23/2020 5:01 PM AK

Approved by: Nicolasa Carrero MD on 11/23/2020 5:01 PM [facility]

 

 

Station ID:  [id number]

## 2021-04-17 ENCOUNTER — HOSPITAL ENCOUNTER (OUTPATIENT)
Dept: HOSPITAL 76 - DI | Age: 71
Discharge: HOME | End: 2021-04-17
Attending: OTOLARYNGOLOGY
Payer: MEDICARE

## 2021-04-17 DIAGNOSIS — J32.0: Primary | ICD-10-CM

## 2021-04-17 NOTE — CT REPORT
PROCEDURE:  Sinuses

 

INDICATIONS:  L MAXILLARY SINUSITIS

 

TECHNIQUE: 

Noncontrast 3.0 mm axial images acquired from the frontal sinuses to the mid-sella, with coronal and 
sagittal reformats. For radiation dose reduction, the following was used:  automated exposure control
, adjustment of mA and/or kV according to patient size. 

 

COMPARISON:  None.

 

FINDINGS:  

Image quality:  Excellent.  

 

Mild mucosal thickening noted in the floor of the right maxillary sinus. Small mucous retention cyst 
versus polyp noted in the left maxillary sinus. No air-fluid levels identified in the paranasal sinus
es. The ostiomeatal units are patent bilaterally. No osseous thickening, osseous remodeling or osseou
s erosive changes.

 

Nasal septum is midline. No dez bullosa or paradoxical turbinates. Type II cribriform plate. There
 is slight variance in the ethmoid roof anatomy with the right cribriform plate situated approximatel
y 1 mm inferior to the left cribriform plate. Anterior ethmoid artery notches are protected bilateral
ly. Type III left frontal recess cells. 

 

 

IMPRESSION:  

 

1. Mild right maxillary sinus mucosal thickening.

 

2. Small left maxillary sinus mucous retention cyst versus polyp.

 

3. No paranasal sinus air-fluid levels.

 

Reviewed by: Keeley Pickett MD, PhD on 4/17/2021 4:24 PM PDT

Approved by: Keeley Pickett MD, PhD on 4/17/2021 4:24 PM PDT

 

 

Station ID:  [id number]

## 2021-05-19 ENCOUNTER — HOSPITAL ENCOUNTER (OUTPATIENT)
Dept: HOSPITAL 76 - LAB | Age: 71
Discharge: HOME | End: 2021-05-19
Attending: OTOLARYNGOLOGY
Payer: MEDICARE

## 2021-05-19 DIAGNOSIS — J35.1: ICD-10-CM

## 2021-05-19 DIAGNOSIS — R13.10: Primary | ICD-10-CM

## 2021-05-19 LAB
CREAT SERPLBLD-SCNC: 0.7 MG/DL (ref 0.4–1)
GFRSERPLBLD MDRD-ARVRAT: 83 ML/MIN/{1.73_M2} (ref 89–?)

## 2021-05-19 PROCEDURE — 82565 ASSAY OF CREATININE: CPT

## 2021-05-19 PROCEDURE — 70491 CT SOFT TISSUE NECK W/DYE: CPT

## 2021-05-19 PROCEDURE — 36415 COLL VENOUS BLD VENIPUNCTURE: CPT

## 2021-05-19 NOTE — CT REPORT
PROCEDURE:  SOFT TISSUE NECK W

 

INDICATIONS:  DYSPHAGIA

 

CONTRAST:  IV CONTRAST: Optiray 320 ml: 100 PO CONTRAST: *NO PO CONTRAST 

 

TECHNIQUE:  

After the administration of intravenous contrast, 3.0 mm axial sections acquired from the sella to th
e aortic arch.  Additional oblique axial 3.0 mm sections acquired through the pharynx.  3 mm thick co
manjeet reformats were generated.  For radiation dose reduction, the following was used:  automated exp
osure control, adjustment of mA and/or kV according to patient size.

 

COMPARISON:  Correlation is made with the overlapping portions of the sinus CT, 4/17/2021 and head CT
, 9/30/2019.

 

FINDINGS:  

Image quality:  Excellent.  

 

Lymph nodes:  No enlarged lymph nodes seen throughout the neck.  

 

Vessels:  Visualized vasculature appears patent.  

 

Neck spaces:  The oropharynx, nasopharynx, and pharynx demonstrate no mucosal lesions.  The vocal cor
ds, false vocal cords, pyriform sinuses, epiglottis, vallecula, and tongue base all appear normal.  E
xtramucosal spaces appear unremarkable.  

 

Glands:  The parotid and submandibular glands appear normal.  The thyroid is normal in size and there
 are no significant incidental findings.

 

Miscellaneous:  Visualized brain and orbits appear normal.  Lung apices appear clear.  Superficial so
ft tissues appear normal.

 

Bones:  No suspicious bony lesions.  Visualized sinuses and mastoids appear unremarkable.  Mild dextr
oconvex cervicothoracic sclerotic curvature is seen. Degenerative changes are seen, which are overall
 worst at the C6-C7 level.

 

 

 

 

IMPRESSION: 

 

No imaging explanation is found for the patient's presenting symptoms.

 

No masses are seen.

 

No enlarged lymph nodes are detected.

 

Reviewed by: Mayur Quispe MD on 5/19/2021 1:13 PM [id number]

Approved by: Mayur Quispe MD on 5/19/2021 1:13 PM [facility]

 

 

Station ID:  [id number]

## 2021-06-08 ENCOUNTER — HOSPITAL ENCOUNTER (OUTPATIENT)
Dept: HOSPITAL 76 - ED | Age: 71
Setting detail: OBSERVATION
LOS: 1 days | Discharge: HOME | End: 2021-06-09
Attending: NURSE PRACTITIONER | Admitting: SPECIALIST
Payer: MEDICARE

## 2021-06-08 DIAGNOSIS — E87.5: ICD-10-CM

## 2021-06-08 DIAGNOSIS — I10: ICD-10-CM

## 2021-06-08 DIAGNOSIS — E87.6: ICD-10-CM

## 2021-06-08 DIAGNOSIS — R42: ICD-10-CM

## 2021-06-08 DIAGNOSIS — Z20.822: ICD-10-CM

## 2021-06-08 DIAGNOSIS — M19.90: ICD-10-CM

## 2021-06-08 DIAGNOSIS — K21.9: ICD-10-CM

## 2021-06-08 DIAGNOSIS — M81.0: ICD-10-CM

## 2021-06-08 DIAGNOSIS — Z86.73: ICD-10-CM

## 2021-06-08 DIAGNOSIS — E04.1: ICD-10-CM

## 2021-06-08 DIAGNOSIS — G45.9: Primary | ICD-10-CM

## 2021-06-08 DIAGNOSIS — K58.2: ICD-10-CM

## 2021-06-08 DIAGNOSIS — Z82.49: ICD-10-CM

## 2021-06-08 DIAGNOSIS — H91.90: ICD-10-CM

## 2021-06-08 DIAGNOSIS — H54.7: ICD-10-CM

## 2021-06-08 DIAGNOSIS — J43.2: ICD-10-CM

## 2021-06-08 DIAGNOSIS — Z79.899: ICD-10-CM

## 2021-06-08 LAB
ALBUMIN DIAFP-MCNC: 5.1 G/DL (ref 3.2–5.5)
ALBUMIN/GLOB SERPL: 1.9 {RATIO} (ref 1–2.2)
ALP SERPL-CCNC: 54 IU/L (ref 42–121)
ALT SERPL W P-5'-P-CCNC: 28 IU/L (ref 10–60)
ANION GAP SERPL CALCULATED.4IONS-SCNC: 11 MMOL/L (ref 6–13)
APTT PPP: 26.2 SECS (ref 24.9–33.3)
AST SERPL W P-5'-P-CCNC: 29 IU/L (ref 10–42)
B PARAPERT DNA SPEC QL NAA+PROBE: NOT DETECTED
B PERT DNA SPEC QL NAA+PROBE: NOT DETECTED
BASOPHILS NFR BLD AUTO: 0.1 10^3/UL (ref 0–0.1)
BASOPHILS NFR BLD AUTO: 0.8 %
BILIRUB BLD-MCNC: 1 MG/DL (ref 0.2–1)
BUN SERPL-MCNC: 12 MG/DL (ref 6–20)
C PNEUM DNA NPH QL NAA+NON-PROBE: NOT DETECTED
CALCIUM UR-MCNC: 9.8 MG/DL (ref 8.5–10.3)
CHLORIDE SERPL-SCNC: 96 MMOL/L (ref 101–111)
CLARITY UR REFRACT.AUTO: CLEAR
CO2 SERPL-SCNC: 29 MMOL/L (ref 21–32)
CREAT SERPLBLD-SCNC: 0.6 MG/DL (ref 0.4–1)
EOSINOPHIL # BLD AUTO: 0.1 10^3/UL (ref 0–0.7)
EOSINOPHIL NFR BLD AUTO: 1.6 %
ERYTHROCYTE [DISTWIDTH] IN BLOOD BY AUTOMATED COUNT: 13.7 % (ref 12–15)
FLUAV RNA RESP QL NAA+PROBE: NOT DETECTED
GFRSERPLBLD MDRD-ARVRAT: 99 ML/MIN/{1.73_M2} (ref 89–?)
GLOBULIN SER-MCNC: 2.7 G/DL (ref 2.1–4.2)
GLUCOSE SERPL-MCNC: 102 MG/DL (ref 70–100)
GLUCOSE UR QL STRIP.AUTO: NEGATIVE MG/DL
HAEM INFLU B DNA SPEC QL NAA+PROBE: NOT DETECTED
HCOV 229E RNA SPEC QL NAA+PROBE: NOT DETECTED
HCOV HKU1 RNA UPPER RESP QL NAA+PROBE: NOT DETECTED
HCOV NL63 RNA ASPIRATE QL NAA+PROBE: NOT DETECTED
HCOV OC43 RNA SPEC QL NAA+PROBE: NOT DETECTED
HCT VFR BLD AUTO: 44.9 % (ref 37–47)
HGB UR QL STRIP: 15.2 G/DL (ref 12–16)
HMPV AG SPEC QL: NOT DETECTED
HPIV1 RNA NPH QL NAA+PROBE: NOT DETECTED
HPIV2 SPEC QL CULT: NOT DETECTED
HPIV3 AB TITR SER CF: NOT DETECTED {TITER}
HPIV4 RNA SPEC QL NAA+PROBE: NOT DETECTED
INR PPP: 1.1 (ref 0.8–1.2)
KETONES UR QL STRIP.AUTO: NEGATIVE MG/DL
LYMPHOCYTES # SPEC AUTO: 1.9 10^3/UL (ref 1.5–3.5)
LYMPHOCYTES NFR BLD AUTO: 30.1 %
M PNEUMO DNA SPEC QL NAA+PROBE: NOT DETECTED
MCH RBC QN AUTO: 29.1 PG (ref 27–31)
MCHC RBC AUTO-ENTMCNC: 33.9 G/DL (ref 32–36)
MCV RBC AUTO: 86 FL (ref 81–99)
MONOCYTES # BLD AUTO: 0.4 10^3/UL (ref 0–1)
MONOCYTES NFR BLD AUTO: 5.9 %
NEUTROPHILS # BLD AUTO: 4 10^3/UL (ref 1.5–6.6)
NEUTROPHILS # SNV AUTO: 6.4 X10^3/UL (ref 4.8–10.8)
NEUTROPHILS NFR BLD AUTO: 61.4 %
NITRITE UR QL STRIP.AUTO: NEGATIVE
NRBC # BLD AUTO: 0 /100WBC
NRBC # BLD AUTO: 0 X10^3/UL
PDW BLD AUTO: 9 FL (ref 7.9–10.8)
PH UR STRIP.AUTO: 6 PH (ref 5–7.5)
PLATELET # BLD: 220 10^3/UL (ref 130–450)
POTASSIUM SERPL-SCNC: 3.2 MMOL/L (ref 3.5–5)
PROT SPEC-MCNC: 7.8 G/DL (ref 6.7–8.2)
PROT UR STRIP.AUTO-MCNC: NEGATIVE MG/DL
PROTHROM ACT/NOR PPP: 12.4 SECS (ref 9.9–12.6)
RBC # UR STRIP.AUTO: NEGATIVE /UL
RBC MAR: 5.22 10^6/UL (ref 4.2–5.4)
RSV RNA RESP QL NAA+PROBE: NOT DETECTED
RV+EV RNA SPEC QL NAA+PROBE: NOT DETECTED
SARS-COV-2 RNA PNL SPEC NAA+PROBE: NOT DETECTED
SODIUM SERPLBLD-SCNC: 136 MMOL/L (ref 135–145)
SP GR UR STRIP.AUTO: 1.01 (ref 1–1.03)
UROBILINOGEN UR QL STRIP.AUTO: (no result) E.U./DL
UROBILINOGEN UR STRIP.AUTO-MCNC: NEGATIVE MG/DL

## 2021-06-08 PROCEDURE — 85610 PROTHROMBIN TIME: CPT

## 2021-06-08 PROCEDURE — 80061 LIPID PANEL: CPT

## 2021-06-08 PROCEDURE — 80048 BASIC METABOLIC PNL TOTAL CA: CPT

## 2021-06-08 PROCEDURE — 85025 COMPLETE CBC W/AUTO DIFF WBC: CPT

## 2021-06-08 PROCEDURE — 70551 MRI BRAIN STEM W/O DYE: CPT

## 2021-06-08 PROCEDURE — 85730 THROMBOPLASTIN TIME PARTIAL: CPT

## 2021-06-08 PROCEDURE — 96372 THER/PROPH/DIAG INJ SC/IM: CPT

## 2021-06-08 PROCEDURE — 70498 CT ANGIOGRAPHY NECK: CPT

## 2021-06-08 PROCEDURE — 81001 URINALYSIS AUTO W/SCOPE: CPT

## 2021-06-08 PROCEDURE — 99285 EMERGENCY DEPT VISIT HI MDM: CPT

## 2021-06-08 PROCEDURE — 87086 URINE CULTURE/COLONY COUNT: CPT

## 2021-06-08 PROCEDURE — 97166 OT EVAL MOD COMPLEX 45 MIN: CPT

## 2021-06-08 PROCEDURE — 97162 PT EVAL MOD COMPLEX 30 MIN: CPT

## 2021-06-08 PROCEDURE — 36415 COLL VENOUS BLD VENIPUNCTURE: CPT

## 2021-06-08 PROCEDURE — 87631 RESP VIRUS 3-5 TARGETS: CPT

## 2021-06-08 PROCEDURE — 80053 COMPREHEN METABOLIC PANEL: CPT

## 2021-06-08 PROCEDURE — 97116 GAIT TRAINING THERAPY: CPT

## 2021-06-08 PROCEDURE — 93306 TTE W/DOPPLER COMPLETE: CPT

## 2021-06-08 PROCEDURE — 83721 ASSAY OF BLOOD LIPOPROTEIN: CPT

## 2021-06-08 PROCEDURE — 0202U NFCT DS 22 TRGT SARS-COV-2: CPT

## 2021-06-08 PROCEDURE — 81003 URINALYSIS AUTO W/O SCOPE: CPT

## 2021-06-08 PROCEDURE — 93005 ELECTROCARDIOGRAM TRACING: CPT

## 2021-06-08 PROCEDURE — 70496 CT ANGIOGRAPHY HEAD: CPT

## 2021-06-08 RX ADMIN — POTASSIUM CHLORIDE SCH MEQ: 1500 TABLET, EXTENDED RELEASE ORAL at 21:59

## 2021-06-08 NOTE — HISTORY & PHYSICAL EXAMINATION
Chief Complaint





- Chief Complaint


Chief Complaint: left arm, left leg weak w/speech def





History of Present Illness





- Admitted From


Admitted From:: Home via ER





- History Obtained From


Records Reviewed: [facility]


History obtained from: patient and Dr. Real


Exam Limitations: none





- History of Present Illness


HPI Comment/Other: 





Patient is a 70-year-old white female whose risk factors for stroke are 

hypertension, age, hyperlipidemia and a positive family history.  She does not 

smoke and she is not a diabetic.  She says that greater than 12 years ago she 

had a similar episode and was seen in the emergency room here in our hospital.  

But she was now placed in observation and she really does not recall what 

happened after that.





With this episode, she recently moved into a new house 10 months ago.  A week 

ago she noticed a smell that was "off" and was getting worse and worse 

throughout the week.  She thinks that a rodent or some small animal  in a 

wall somewhere and that was the cause of the smell.  But at the same time she 

started feeling "yucky".  She has a long history of vertigo and just felt more 

off balance than usual.  More lightheaded.  Not nauseated.  She felt "shaky 

inside".  About 7 days ago she woke up 1 morning to get up to go to the bathroom

and her left arm felt numb.  She could not use it, could not move it.  She had 

to use her right arm to move her left arm.  She went to the bathroom, got up.  

Had no weakness of her legs, no ataxia.  She rubbed it and it felt better.  So 

she went back to sleep.  She had a second episode while she was driving her car.

 Again involves only her left arm and it lasted about 30 minutes and then went 

away.  Today, she was writing at her desk.  "I like to write letters a lot".  

While she was writing her letters, her left arm got heavier and heavier.  And as

she sat there she realized that her left foot was also falling asleep and 

feeling numb.  She denied amaurosis.  Palpitations were present only when she 

got here in the emergency room.  She felt like she was falling backwards.  

Vision was slightly blurred and foggy but no actual loss of vision.  She has had

a headache all this week that has been getting gradually worse.





She was evaluated by Dr. Espinal.  Temperature was 36.6.  Pulse was 76.  She was 

sinus on telemetry.  Blood pressure 142/82.  Respirations 16 and she was 99% on 

room air.  Dr. Espinal did not find any focal neurological deficits.  CT of the 

head was negative for acute stroke, as was CT angiogram.  Her labs are overall 

normal.  Dr. Espinal is asking that this patient be placed in observation for TIA

work-up.





History





- Past Medical History


Cardiovascular: reports: Hypertension, High cholesterol


Respiratory: reports: None


Neuro: reports: TIA (Greater than 12 years ago), Headaches (Off and on.  No 

diagnosis of migraines), Other (Vertigo)


Endocrine/Autoimmune: reports: None


GI: reports: GERD, Chronic diarrhea, Chronic constipation, Cholelithiasis


GYN: reports: Ovarian cysts, Other ()


: reports: None


HEENT: reports: Other (wears glasses)


Psych: reports: Depression (situation w death of 1st )


Musculoskeletal: reports: Osteoarthritis, Osteoporosis (fall w left wrist fx 8 

mon ago)


Derm: reports: Other


MRSA Hx?: No


Other Past Medical History: IBS





- Past Surgical History


General: reports: Colonoscopy, EGD, Other (right inguinal hernia repair)


Ortho: reports: Rotator cuff repair


/GYN: reports: Tubal ligation, Hysterectomy, Oophrectomy, Other (each breast w

cyst excision)


HEENT: reports: Rhinoplasty (for deviated septum)





- Family & Social History


Family History Comment/Other: Her mom  at age 33 due to a heart attack.  But

she had antecedent valvular heart disease.  Probably rheumatic heart.  Dad  

at age 52 of a heart attack.  Of 5 siblings one is  of melanoma.  One s

teto has diabetes.  The other siblings are healthy.  She denies a history of 

diabetes, hypertension, heart attack, stroke, cancer.  Her 2 children are 

completely healthy and have no medical illnesses.


Living arrangement: At home


Living Situation: With spouse/s.o.


Social History Notes: She was born in [place], then moved to [place] 

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  Then to 

[place].  She  [place]  [place]  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  Her   12 years ago.  She has been with a 

significant other for the last 11 years but not  to him.  She never 

smoked.  She has no history of alcohol abuse.  She has no history of 

recreational substance abuse.





- Substance History


Use: Uses substance without health or social issues: NONE


Abuse: Recurrent use of substance despite neg consequences: NONE


Dependence: Experiences withdrawal or developed tolerances: NONE





- POLST


Patient has POLST: No


POLST Status: Full Code





Meds/Allgy





- Home Medications


Home Medications: 


                                Ambulatory Orders











 Medication  Instructions  Recorded  Confirmed


 


Potassium Chloride [K-Dur] 20 meq PO BID 17


 


Meclizine [Antivert] 25 mg PO Q6H PRN #30 tablet 19


 


Ondansetron Odt [Zofran] 4 mg TL Q6H PRN #10 tablet 19


 


Amlodipine Besylate [Norvasc] 10 mg PO DAILY 21


 


Atorvastatin [Lipitor] 20 mg ORAL DAILY 21


 


Calcium Carbonate [Calcium Antacid] 400 mg PO DAILY 21


 


Cholecalciferol [Vitamin D3] 2,000 unit PO DAILY 21


 


Triamterene/Hydrochlorothiazid 1.5 tab ORAL DAILY 21





[Maxzide 37.5 mg-25 mg Tablet]   














- Allergies


Allergies/Adverse Reactions: 


                                    Allergies











Allergy/AdvReac Type Severity Reaction Status Date / Time


 


No Known Drug Allergies Allergy   Verified 21 16:50














Review of Systems





- Constitutional


Constitutional: denies: Fatigue, Fever, Chills, Malaise, Weakness, Night sweats





- Eyes


Eyes: reports: Blurred vision.  denies: Pain, Irritation, Amaurosis, Field loss,

Vision loss





- Ears, Nose & Throat


Ears, Nose & Throat: reports: Hearing loss, Vertigo.  denies: Ear pain, Hearing 

aids, Tinnitus, Nasal pain, Nasal discharge, Nasal obstruction, Nasal congestion





- Cardiovascular


Cariovascular: reports: Palpitations (only in the ER), Lightheadedness.  denies:

Irregular heart rate, Chest pain, Edema, Syncope, Exertional dyspnea, Decr. 

exercise tolerance





- Respiratory


Respiratory: denies: Cough, Sputum production, Wheezing, Snoring, Orthopnea, SOB

at rest, SOB with exertion





- Gastrointestinal


Gastrointestinal: reports: Constipation, Diarrhea.  denies: Change in bowel 

habits





- Genitourinary


Genitourinary: denies: Dysuria, Frequency, Urgency, Incontinence





- Musculoskeletal


Musculoskeletal: reports: Stiffness, Joint pain (left wrist, finger joints 

getting deformed but no pain).  denies: Muscle pain, Back pain, Muscle aches





- Integumentary


Integumentary: denies: Rash, Pruritis, Lesions





- Neurological


Neurological: reports: Focal weakness, Headache, Dizziness, Numbness, Memory 

problems (today).  denies: General weakness, Pre-existing deficit, Abnormal gait





- Psychiatric


Psychiatric: denies: Depression, Anxiety, Suicidal, Delusions





- Endocrine


Endocrine: denies: Polyuria, Polydypsia, Polyphagia





- Hematologic/Lymphatic


Hematologic/Lymphatic: denies: Anemia, Bruising, Petechiae, Blood clots


Prior Level of Functionality: 





She regards her self is completely independent with activities of daily living. 

She still drives a car, does housework, pays bills





Exam





- Vital Signs


Reviewed Vital Signs: Yes


Vital Signs: 





                                Vital Signs x48h











  Temp Pulse Resp BP Pulse Ox


 


 21 20:00   66  14  145/84 H  99


 


 21 18:23  36.5 C  74  14  135/70 H  98


 


 21 17:35  37.2 C  80  12  145/79 H  100


 


 21 16:42  36.6 C  76  16  142/82 H  99














- Physical Exam


General Appearance: positive: No acute distress, Alert, Other (White female who 

looks her stated age, 5 feet 10 inches tall and weighs 83.9 kg)


Eyes Bilateral: positive: PERRL, EOMI, Other


ENT: positive: No signs of dehydration


Neck: positive: No JVD.  negative: Stiff neck


Cardiovascular: positive: No murmur (Wearing glasses)


Skin: positive: Warm, Dry


Extremities: positive: Full ROM


Neurologic/Psychiatric: positive: Oriented x3, CN's nml (2-12), Motor nml, 

Sensation nml





Conclusion/Plan





- Problem List


(1) TIA (transient ischemic attack)


Conclusion/Plan: 


Risk factors for this unfortunate female are high.  She has hypertension, age, 

hyperlipidemia, and a positive family history.  CT of the head is currently 

negative.





Plan:


Observation status


Echocardiogram


Telemetry


MRI of the head


Double platelet therapy with aspirin and Plavix for minimum of 30 days.


She will need to discuss double platelet therapy with her primary care provider 

and they will need to decide to go either on aspirin only or Plavix only








(2) Hypertension


Conclusion/Plan: 


Permissive hypertension in the next 48 hours.  Her systolic is very between 142-

151 today.  She is on triamterene hydrochlorothiazide and amlodipine at home.  

Both of those medications will be continued.  However her blood pressure will be

monitored closely.  If she starts to get below 140 on a regular basis I may hold

her medications for 48 hours.


Qualifiers: 


   Hypertension type: essential hypertension   Qualified Code(s): I10 - 

Essential (primary) hypertension   





(3) Osteoporosis


Conclusion/Plan: 


Or twice a year Prolia permissive hypertension in the next 48 hours.  Her 

systolic is very between 142-151 today.  She is on triamterene hydro

chlorothiazide and amlodipine at home.  Both of those medications will be 

continued.  However her blood pressure will be monitored closely.  If she starts

to get below 140 on a regular basis I may hold her medications for 48 hours.


Qualifiers: 


   Osteoporosis type: age-related 





(4) Left thyroid nodule


Conclusion/Plan: 


Incidental finding on CT of the neck.  She will need work-up in the outpatient 

setting to make sure she does not have neoplasm.








(5) Centrilobular emphysema


Conclusion/Plan: 


Asymptomatic.  She has no history of asthma or emphysema or smoking.  Is a 

natural process of aging occurs, she may become symptomatic with wheezing.  

Would suggest routine follow-up with primary care provider.  Would recommend 

outpatient pulmonary function studies with [facility].








(6) Hypokalemia


Conclusion/Plan: 


Is likely due to the new diuretic therapy that was started in the last 1 to 2 

months.  She will be supplemented orally and recheck tomorrow morning.








- Lab Results


Lab results reviewed: Yes


Fish Bones: 


                                 21 17:25





                                 21 17:25





- Diagnostic Imaging Results


Diagnostic Imaging Results: positive: Final report reviewed


Diagnostic Imaging Results Comments: 





CT of the head is without evidence of acute stroke, hemorrhage, or mass.  

Angiogram has unremarkable anatomy.  No evidence of stenosis, aneurysm, 

occlusion or focal filling defect.





Neck CT angiogram has mild bilateral proximal internal carotid artery plaquing 

without stenosis.  Interesting mild centrilobular emphysema in the lung apices. 

A 0.9 cm left lobe thyroid nodule.





- EKG Results


EKG Interpreted Independently: Yes


EKG Comparison: No prior EKG


EKG Findings: 





Normal sinus rhythm with normal axis and normal intervals.  Nonspecific ST 

depression V2 through V6.  The nonspecific depression is new from a 2019 EKG.

## 2021-06-08 NOTE — CT REPORT
PROCEDURE:  ANGIO NECK W

 

INDICATIONS:  L sided weakness x 5 hours

 

CONTRAST:  IV CONTRAST: Optiray 320 ml: 80 PO CONTRAST: *NO PO CONTRAST 

 

TECHNIQUE:  

After the administration of intravenous contrast, 1.5 mm axial sections acquired from the aortic arch
 to the [place].  Coronal 3-D maximum intensity projection (MIP) and/or volume rendering ref
ormats were then performed.  For radiation dose reduction, the following was used:  automated exposur
e control, adjustment of mA and/or kV according to patient size.

 

COMPARISON:  CT angiogram of the head from the same date.

 

FINDINGS:  

Image quality:  Excellent.  

 

Carotid system:  The great vessels demonstrate a conventional anatomy as they arise from the aortic a
rch.  The origins of the common carotid arteries appear patent.  The common carotid arteries demonstr
ate normal calibers and courses. Mild carotid bifurcation plaque. Mild bilateral proximal internal ca
rotid artery plaque without stenosis.  

 

Posterior circulation:  The origins of the vertebral arteries appear patent.  The more superior porti
ons of the vertebral arteries demonstrate normal course and caliber.  They join to form a normal appe
aring basilar artery.  

 

Soft tissues:  Visualized neck soft tissues demonstrate no suspicious abnormalities.  There is a 1 cm
 posterior left thyroid nodule.

 

Bones:  No suspicious bony lesions.  Visualized cervical spine appears normally aligned.   

 

IMPRESSION:  

1. Mild bilateral proximal internal carotid artery plaque without stenosis.

2. Mild centrilobular emphysema in the lung apices.

3. 0.9 cm left lobe thyroid nodule.

4. Otherwise unremarkable CTA neck.

 

Comment: Please refer to a separate report for CTA Head findings.

 

Above discussed with GAYLE JOHNSON at the time of dictation on 6/8/2021 at 1845 hours.

 

The estimate of stenosis included in the report of the imaging study was calculated using the NASCET 
method

 

 

 

CLINICAL RECOMMENDATION STATEMENTS:

In patients <35 years with an ITN detected on CT, MRI, or extrathyroidal ultrasound, the Committee re
commends further evaluation with dedicated thyroid ultrasound if the nodule is ?1 cm and has no suspi
cious imaging features, and if the patient has normal life expectancy.

In patients ?35 years with an ITN detected on CT, MRI, or extrathyroidal ultrasound, the Committee re
commends further evaluation with dedicated thyroid ultrasound if the nodule is ?1.5 cm and has no beth
picious imaging features, and if the patient has normal life expectancy. (ACR, 2014)

 

Reviewed by: Rufus Mcbride MD on 6/8/2021 6:43 PM PDT

Approved by: Rufus Mcbride MD on 6/8/2021 6:43 PM PDT

 

 

Station ID:  [id number]

## 2021-06-08 NOTE — ED PHYSICIAN DOCUMENTATION
PD HPI FOCAL NEURO





- Stated complaint


Stated Complaint: VISION CHANGES/LT SIDED WEAKNESS





- Chief complaint


Chief Complaint: General





- History obtained from


History obtained from: Patient, Family





- History of Present Illness


Timing - onset: How many days ago (3)


Timing - duration: Days (3)


Timing - details: Intermittant


Weakness: Arm, Hand, Leg, Foot, Left


Numbness: Arm, Hand, Leg, Foot, Left


Associated symptoms: No: Headache, Nausea / vomiting, Seizure, Syncope, Fall, 

Head injury, Chest pain, Neck pain, Back pain


Contributing factors: negative: Anticoagulated, Vascular dz, Atrial fibrillation


Baseline status: positive: A&OX3, ambulatory, indep





- Additional information


Additional information: 





70-year-old female presents with her  to the emergency department she 

states that about 3 days ago she woke up in the morning and could not move her 

left arm.  She states that she thought that was odd but was not too concerned 

about it because she was tired.  She states that she went back to bed and when 

she woke up her arm was moving normally.  Since that time she had another 

episode where her left arm felt heavy and weak, this lasted for about an hour.  

Today she states similar symptoms recurred at about 1:00 today when she was 

writing a letter.  She states that her left leg and left arm feel heavy.  She 

states she has had difficulty with word finding recently as well.  She states 

that her vision was blurry earlier today, it is currently normal.  No history of

strokes.  Nothing makes this better or worse.  Denies any trauma.  No chest 

pain.  No shortness of breath.





Review of Systems


Ten Systems: 10 systems reviewed and negative


Constitutional: denies: Fever, Chills


Ears: denies: Ear pain


Nose: denies: Rhinorrhea / runny nose, Congestion


Respiratory: denies: Cough


GI: denies: Nausea, Vomiting, Diarrhea


Skin: denies: Rash


Musculoskeletal: denies: Neck pain, Back pain


Neurologic: denies: Near syncope, Seizure, Headache, Head injury, LOC





PD PAST MEDICAL HISTORY





- Past Medical History


Past Medical History: Yes


Cardiovascular: Hypertension, High cholesterol


Respiratory: None


Neuro: None


Endocrine/Autoimmune: None


GI: GERD, Chronic diarrhea, Chronic constipation, Cholelithiasis


GYN: Ovarian cysts


: None


HEENT: Other


Psych: Depression


Musculoskeletal: Osteoarthritis


Derm: Other


Other Past Medical History: IBS





- Past Surgical History


Past Surgical History: Yes


General: Colonoscopy, EGD


Ortho: Rotator cuff repair


/GYN: Tubal ligation, Hysterectomy, Oophrectomy, Other





- Present Medications


Home Medications: 


                                Ambulatory Orders











 Medication  Instructions  Recorded  Confirmed


 


Potassium Chloride [K-Dur] 20 meq PO BID 04/18/17 06/08/21


 


Meclizine [Antivert] 25 mg PO Q6H PRN #30 tablet 09/30/19 06/08/21


 


Ondansetron Odt [Zofran] 4 mg TL Q6H PRN #10 tablet 09/30/19 06/08/21


 


Amlodipine Besylate [Norvasc] 10 mg PO DAILY 06/08/21 06/08/21


 


Atorvastatin [Lipitor] 20 mg ORAL DAILY 06/08/21 06/08/21


 


Calcium Carbonate [Calcium Antacid] 400 mg PO DAILY 06/08/21 06/08/21


 


Cholecalciferol [Vitamin D3] 2,000 unit PO DAILY 06/08/21 06/08/21


 


Triamterene/Hydrochlorothiazid 1.5 tab ORAL DAILY 06/08/21 06/08/21





[Maxzide 37.5 mg-25 mg Tablet]   














- Allergies


Allergies/Adverse Reactions: 


                                    Allergies











Allergy/AdvReac Type Severity Reaction Status Date / Time


 


No Known Drug Allergies Allergy   Verified 06/08/21 16:50














- Social History


Does the pt smoke?: No


Smoking Status: Never smoker


Does the pt drink ETOH?: No


Does the pt have substance abuse?: No





- Immunizations


Immunizations are current?: Yes





- POLST


Patient has POLST: No





PD ED PE NORMAL





- Vitals


Vital signs reviewed: Yes





- General


General: Alert and oriented X 3, No acute distress, Well developed/nourished





- HEENT


HEENT: Atraumatic, PERRL, Ears normal, Moist mucous membranes, Pharynx benign





- Neck


Neck: Supple, no meningeal sign, No bony TTP, No JVD, No bruit





- Cardiac


Cardiac: RRR, No murmur, Strong equal pulses





- Respiratory


Respiratory: No respiratory distress, Clear bilaterally





- Abdomen


Abdomen: Soft, Non tender, Non distended





- Back


Back: No CVA TTP, No spinal TTP





- Derm


Derm: Warm and dry





- Extremities


Extremities: No edema, No calf tenderness / cord





- Neuro


Neuro: Alert and oriented X 3, CNs 2-12 intact, Normal speech, Other (4-5 

strength in the left upper extremity and left lower extremity.  Mild decrease 

sensation over the left arm and left leg.)





- Psych


Psych: Normal mood, Normal affect





NIHSS





- Time


Time: 17:10





- Level of Consciousness


Level of consciousness: (0) Alert, Keenly responsive


LOC Questions: (0) Answers both Q's correct


LOC Commands: (0) Performs both correctly





- Gaze


Best Gaze: (0) Normal





- Visual


Visual: (0) No loss





- Facial Palsy


Facial Palsy: (0) Normal, symmetrical movement





- Motor Arms (both separate)


Motor Arm (right): (0) No drift


Motor Arm (left): (1) Drift





- Motor Legs (both separate)


Motor Leg (right): (0) No drift


Motor Leg (left): (1) Drift





- Limb Ataxia


Limb Ataxia: (0) Absent





- Sensory


Sensory: (1) Mild-to-moderate loss





- Best Language


Best Language: (0) No aphasia





- Dysarthria


Dysarthria: (0) Normal





- Extinction and Inattention (formally neg


Extinction and inattention: (0) No abnormality





- Total Score/Results


Total Score/Result: 3





Results





- Vitals


Vitals: 


                               Vital Signs - 24 hr











  06/08/21 06/08/21 06/08/21





  16:42 17:35 18:23


 


Temperature 36.6 C 37.2 C 36.5 C


 


Heart Rate 76 80 74


 


Respiratory 16 12 14





Rate   


 


Blood Pressure 142/82 H 145/79 H 135/70 H


 


O2 Saturation 99 100 98














  06/08/21





  20:00


 


Temperature 


 


Heart Rate 66


 


Respiratory 14





Rate 


 


Blood Pressure 145/84 H


 


O2 Saturation 99








                                     Oxygen











O2 Source                      Room air

















- EKG (time done)


  ** 1720


Rate: Rate (enter#) (70)


Rhythm: NSR


Axis: Normal


Intervals: Normal MD


QRS: Normal


Ischemia: Normal ST segments





- Labs


Labs: 


                                Laboratory Tests











  06/08/21 06/08/21 06/08/21





  17:25 17:25 17:25


 


WBC  6.4  


 


RBC  5.22  


 


Hgb  15.2  


 


Hct  44.9  


 


MCV  86.0  


 


MCH  29.1  


 


MCHC  33.9  


 


RDW  13.7  


 


Plt Count  220  


 


MPV  9.0  


 


Neut # (Auto)  4.0  


 


Lymph # (Auto)  1.9  


 


Mono # (Auto)  0.4  


 


Eos # (Auto)  0.1  


 


Baso # (Auto)  0.1  


 


Absolute Nucleated RBC  0.00  


 


Nucleated RBC %  0.0  


 


PT   12.4 


 


INR   1.1 


 


APTT   26.2 


 


Sodium    136


 


Potassium    3.2 L


 


Chloride    96 L


 


Carbon Dioxide    29


 


Anion Gap    11.0


 


BUN    12


 


Creatinine    0.6


 


Estimated GFR (MDRD)    99


 


Glucose    102 H


 


Calcium    9.8


 


Total Bilirubin    1.0


 


AST    29


 


ALT    28


 


Alkaline Phosphatase    54


 


Total Protein    7.8


 


Albumin    5.1


 


Globulin    2.7


 


Albumin/Globulin Ratio    1.9


 


Urine Color   


 


Urine Clarity   


 


Urine pH   


 


Ur Specific Gravity   


 


Urine Protein   


 


Urine Glucose (UA)   


 


Urine Ketones   


 


Urine Occult Blood   


 


Urine Nitrite   


 


Urine Bilirubin   


 


Urine Urobilinogen   


 


Ur Leukocyte Esterase   


 


Ur Microscopic Review   


 


Urine Culture Comments   


 


Nasal Adenovirus (PCR)   


 


Nasal B. parapertussis DNA (PCR)   


 


Nasal Coronavir 229E PCR   


 


Nasal Coronavir HKU1 PCR   


 


Nasal Coronavir NL63 PCR   


 


Nasal Coronavir OC43 PCR   


 


Nasal Enterovir/Rhinovir PCR   


 


Nasal Influenza B PCR   


 


Nasal Influenza A PCR   


 


Nasal Parainfluen 1 PCR   


 


Nasal Parainfluen 2 PCR   


 


Nasal Parainfluen 3 PCR   


 


Nasal Parainfluen 4 PCR   


 


Nasal RSV (PCR)   


 


Nasal B.pertussis DNA PCR   


 


Nasal C.pneumoniae (PCR)   


 


[place]areth Human Metapneumo PCR   


 


Nasal M.pneumoniae (PCR)   


 


Nasal SARS-CoV-2 (PCR)   














  06/08/21 06/08/21





  17:50 18:50


 


WBC  


 


RBC  


 


Hgb  


 


Hct  


 


MCV  


 


MCH  


 


MCHC  


 


RDW  


 


Plt Count  


 


MPV  


 


Neut # (Auto)  


 


Lymph # (Auto)  


 


Mono # (Auto)  


 


Eos # (Auto)  


 


Baso # (Auto)  


 


Absolute Nucleated RBC  


 


Nucleated RBC %  


 


PT  


 


INR  


 


APTT  


 


Sodium  


 


Potassium  


 


Chloride  


 


Carbon Dioxide  


 


Anion Gap  


 


BUN  


 


Creatinine  


 


Estimated GFR (MDRD)  


 


Glucose  


 


Calcium  


 


Total Bilirubin  


 


AST  


 


ALT  


 


Alkaline Phosphatase  


 


Total Protein  


 


Albumin  


 


Globulin  


 


Albumin/Globulin Ratio  


 


Urine Color  YELLOW 


 


Urine Clarity  CLEAR 


 


Urine pH  6.0 


 


Ur Specific Gravity  1.015 


 


Urine Protein  NEGATIVE 


 


Urine Glucose (UA)  NEGATIVE 


 


Urine Ketones  NEGATIVE 


 


Urine Occult Blood  NEGATIVE 


 


Urine Nitrite  NEGATIVE 


 


Urine Bilirubin  NEGATIVE 


 


Urine Urobilinogen  0.2 (NORMAL) 


 


Ur Leukocyte Esterase  NEGATIVE 


 


Ur Microscopic Review  NOT INDICATED 


 


Urine Culture Comments  NOT INDICATED 


 


Nasal Adenovirus (PCR)   NOT DETECTED


 


Nasal B. parapertussis DNA (PCR)   NOT DETECTED


 


Nasal Coronavir 229E PCR   NOT DETECTED


 


Nasal Coronavir HKU1 PCR   NOT DETECTED


 


Nasal Coronavir NL63 PCR   NOT DETECTED


 


Nasal Coronavir OC43 PCR   NOT DETECTED


 


Nasal Enterovir/Rhinovir PCR   NOT DETECTED


 


Nasal Influenza B PCR   NOT DETECTED


 


Nasal Influenza A PCR   NOT DETECTED


 


Nasal Parainfluen 1 PCR   NOT DETECTED


 


Nasal Parainfluen 2 PCR   NOT DETECTED


 


Nasal Parainfluen 3 PCR   NOT DETECTED


 


Nasal Parainfluen 4 PCR   NOT DETECTED


 


Nasal RSV (PCR)   NOT DETECTED


 


Nasal B.pertussis DNA PCR   NOT DETECTED


 


Nasal C.pneumoniae (PCR)   NOT DETECTED


 


Gareth Human Metapneumo PCR   NOT DETECTED


 


Nasal M.pneumoniae (PCR)   NOT DETECTED


 


Nasal SARS-CoV-2 (PCR)   NOT DETECTED














- Rads (name of study)


  ** CT angiogram of the head


Radiology: Prelim report reviewed, EMP read contemporaneously, See rad report





  ** CT angiogram of the neck


Radiology: Prelim report reviewed, EMP read contemporaneously, See rad report





PD MEDICAL DECISION MAKING





- ED course


Complexity details: reviewed results, re-evaluated patient, considered 

differential, d/w patient, d/w family, d/w consultant


ED course: 


Patient with intermittent weakness and numbness to the left arm and left leg 

over the past few days.  Lasted about 5 hours today.  Asymptomatic in the 

emergency department.  Concerning for possible TIA.  Given aspirin here after 

negative CT angiogram of the head and neck.  We will place in observation for 

MRI and further work-up.  Discussed the case with the hospitalist, Dr. Shannon 

who accepts





This document was made in part using voice recognition software. While efforts 

are made to proofread this document, sound alike and grammatical errors may 

occur.











Ct angio neck


IMPRESSION: 


1. Mild bilateral proximal internal carotid artery plaque without stenosis. 


2. Mild centrilobular emphysema in the lung apices. 


3. 0.9 cm left lobe thyroid nodule. 


4. Otherwise unremarkable CTA neck. 








CT angio head:


IMPRESSION: 


1. No evidence acute stroke, hemorrhage, or mass. 


2. Unremarkable CTA head. No evidence of stenosis, aneurysm, occlusion, or focal

filling defect. 





Departure





- Departure


Disposition: ED Place in Observation


Clinical Impression: 


 TIA (transient ischemic attack)





Condition: Stable

## 2021-06-08 NOTE — EXTERNAL MEDICAL SUMMARY RPT
Continuity of Care Document

                             Created on:2021



Patient:MARIZOL MERINO

Sex:Female

:1950

External Reference #:[id number]





Demographics







                          Phone                     Unavailable

 

                          Preferred Language        Unknown

 

                          Marital Status            Unknown

 

                          Taoist Affiliation     Unknown

 

                          Race                      Unknown

 

                          Ethnic Group              Unknown









Author







                          Organization              [facility]

 

                          Address                    [place] [id number]

 

                          Phone                     [phone]









Allergies





Encounters





Medications





Problems





Results

## 2021-06-08 NOTE — CT REPORT
PROCEDURE:  ANGIO HEAD W/WO

 

INDICATIONS:  L sided weakness x 5 hours

 

CONTRAST:  IV CONTRAST: Optiray 320 ml: 80 PO CONTRAST: *NO PO CONTRAST 

 

TECHNIQUE:  

Precontrast 4.5 mm thick angled axial sections acquired from the foramen magnum to the vertex.   Afte
r the administration of intravenous contrast, 1 mm thick sections acquired through the [place] of [place]
is.  Postcontrast 4.5 mm thick sections then re-acquired from the foramen magnum to the vertex.  3-di
mensional maximum-intensity-projection (MIP) and/or volume rendering reformats were acquired of the c
entral intracranial vasculature.  For radiation dose reduction, the following was used:  automated ex
posure control, adjustment of mA and/or kV according to patient size.

 

COMPARISON:  CT head dated 9/30/2019

 

FINDINGS:  

Image quality:  Excellent.  

 

Anterior circulation:  Intracranial internal carotid arteries are normal in size and flow.  The flow 
within the paired anterior cerebral arteries is normal and symmetric.  The flow within the middle cer
ebral arteries is normal and symmetric.  The anterior communicating artery is seen.  No aneurysms are
 seen.  

 

Posterior circulation:  Visualized portions of the vertebral arteries demonstrate normal caliber, and
 join to form a normal appearing basilar artery.  Flow within the posterior cerebral arteries is norm
al and symmetric.  No aneurysms are seen.  

 

CSF spaces:  Ventricles are normal in size and shape.  Basal cisterns are patent.  No extra-axial flu
id collections.  

 

Brain:  No midline shift.  No intracranial bleeds or masses.  Gray-white matter interface appears int
act.  

 

Skull and face:  Calvarium and facial bones appear intact, without suspicious lesions.  

 

Sinuses:  Visualized sinuses and mastoids are clear.  

 

IMPRESSION:  

1. No evidence acute stroke, hemorrhage, or mass.

2. Unremarkable CTA head. No evidence of stenosis, aneurysm, occlusion, or focal filling defect.

 

Above discussed with GAYLE JOHNSON at the time of dictation on 6/8/2021 at 1845 hours.

 

Reviewed by: Rufus Mcbride MD on 6/8/2021 6:42 PM PDT

Approved by: Rufus Mcbride MD on 6/8/2021 6:42 PM PDT

 

 

Station ID:  [id number]

## 2021-06-09 VITALS — SYSTOLIC BLOOD PRESSURE: 113 MMHG | DIASTOLIC BLOOD PRESSURE: 58 MMHG

## 2021-06-09 LAB
ANION GAP SERPL CALCULATED.4IONS-SCNC: 11 MMOL/L (ref 6–13)
BASOPHILS NFR BLD AUTO: 0 10^3/UL (ref 0–0.1)
BASOPHILS NFR BLD AUTO: 0.7 %
BUN SERPL-MCNC: 11 MG/DL (ref 6–20)
CALCIUM UR-MCNC: 10 MG/DL (ref 8.5–10.3)
CHLORIDE SERPL-SCNC: 97 MMOL/L (ref 101–111)
CHOLEST SERPL-MCNC: 196 MG/DL
CO2 SERPL-SCNC: 29 MMOL/L (ref 21–32)
CREAT SERPLBLD-SCNC: 0.6 MG/DL (ref 0.4–1)
EOSINOPHIL # BLD AUTO: 0.2 10^3/UL (ref 0–0.7)
EOSINOPHIL NFR BLD AUTO: 3.4 %
ERYTHROCYTE [DISTWIDTH] IN BLOOD BY AUTOMATED COUNT: 13.8 % (ref 12–15)
GFRSERPLBLD MDRD-ARVRAT: 99 ML/MIN/{1.73_M2} (ref 89–?)
GLUCOSE SERPL-MCNC: 93 MG/DL (ref 70–100)
HCT VFR BLD AUTO: 46.2 % (ref 37–47)
HDLC SERPL-MCNC: 77 MG/DL
HDLC SERPL: 2.5 {RATIO} (ref ?–4.4)
HGB UR QL STRIP: 15.1 G/DL (ref 12–16)
LDLC SERPL CALC-MCNC: 102 MG/DL
LDLC/HDLC SERPL: 1.3 {RATIO} (ref ?–4.4)
LYMPHOCYTES # SPEC AUTO: 1.8 10^3/UL (ref 1.5–3.5)
LYMPHOCYTES NFR BLD AUTO: 32.5 %
MCH RBC QN AUTO: 28.7 PG (ref 27–31)
MCHC RBC AUTO-ENTMCNC: 32.7 G/DL (ref 32–36)
MCV RBC AUTO: 87.7 FL (ref 81–99)
MONOCYTES # BLD AUTO: 0.4 10^3/UL (ref 0–1)
MONOCYTES NFR BLD AUTO: 6.4 %
NEUTROPHILS # BLD AUTO: 3.2 10^3/UL (ref 1.5–6.6)
NEUTROPHILS # SNV AUTO: 5.6 X10^3/UL (ref 4.8–10.8)
NEUTROPHILS NFR BLD AUTO: 56.8 %
NRBC # BLD AUTO: 0 /100WBC
NRBC # BLD AUTO: 0 X10^3/UL
PDW BLD AUTO: 9.1 FL (ref 7.9–10.8)
PLATELET # BLD: 223 10^3/UL (ref 130–450)
POTASSIUM SERPL-SCNC: 3.4 MMOL/L (ref 3.5–5)
RBC MAR: 5.27 10^6/UL (ref 4.2–5.4)
SODIUM SERPLBLD-SCNC: 137 MMOL/L (ref 135–145)
TRIGL P FAST SERPL-MCNC: 86 MG/DL
VLDLC SERPL-SCNC: 17 MG/DL

## 2021-06-09 RX ADMIN — ACETAMINOPHEN PRN MG: 325 TABLET ORAL at 04:33

## 2021-06-09 RX ADMIN — ACETAMINOPHEN PRN MG: 325 TABLET ORAL at 09:32

## 2021-06-09 RX ADMIN — SODIUM CHLORIDE, PRESERVATIVE FREE SCH ML: 5 INJECTION INTRAVENOUS at 00:07

## 2021-06-09 RX ADMIN — SODIUM CHLORIDE, PRESERVATIVE FREE SCH ML: 5 INJECTION INTRAVENOUS at 09:00

## 2021-06-09 RX ADMIN — POTASSIUM CHLORIDE SCH MEQ: 1500 TABLET, EXTENDED RELEASE ORAL at 08:59

## 2021-06-09 NOTE — DISCHARGE SUMMARY
Discharge Summary


Admit Date: 21


Discharge Date: 21


Discharging Provider: Osmar Fitzgerald


Primary Care Provider: Maria Elena Easton


Condition at Discharge: Stable


Discharge Disposition: 01 Home, Self Care


Discharge Facility Name: home





- DIAGNOSES


Discharge Diagnoses with Status of Each Condition: 





(1) TIA (transient ischemic attack)


MRI of brain show no acute stroke. CTA of head, neck and ECHO show unremarkable.

pt's symptoms had significantly improved. PT/OT evaluated and treated for pt. pt

walked about 100 feet with good balance and strength. pt is recommended d/c to 

home and  have out-pt PT/OT, and prescribed Walker. pt is prescribed baby 

aspirin. pt may followup with neurologist as out-pt 





(2) Hypertension


stable. resume home meds 





(3) Left thyroid nodule


Incidental finding on CT of the neck.  pt may followup with her PCP in the 

outpatient setting to make sure she does not have neoplasm.





(4) hx of vertigo


pt report she had hx of vertigo, she had home meds Meclizine PRN.  pt may 

followup with neurologist as out-pt





(5) Hypokalemia


replaced, resume home potassium. 





(6) Centrilobular emphysema


Asymptomatic. pt may follow-up with primary care provider to have outpatient 

pulmonary function studies with [facility].





- HPI


History of Present Illness: 





refer from Dr. Shannon's HPI on 21


Patient is a 70-year-old white female whose risk factors for stroke are 

hypertension, age, hyperlipidemia and a positive family history.  She does not 

smoke and she is not a diabetic.  She says that greater than 12 years ago she 

had a similar episode and was seen in the emergency room here in our hospital.  

But she was now placed in observation and she really does not recall what 

happened after that.





With this episode, she recently moved into a new house 10 months ago.  A week 

ago she noticed a smell that was "off" and was getting worse and worse thro

ughout the week.  She thinks that a rodent or some small animal  in a wall 

somewhere and that was the cause of the smell.  But at the same time she started

feeling "yucky".  She has a long history of vertigo and just felt more off 

balance than usual.  More lightheaded.  Not nauseated.  She felt "shaky inside".

 About 7 days ago she woke up 1 morning to get up to go to the bathroom and her 

left arm felt numb.  She could not use it, could not move it.  She had to use 

her right arm to move her left arm.  She went to the bathroom, got up.  Had no 

weakness of her legs, no ataxia.  She rubbed it and it felt better.  So she went

back to sleep.  She had a second episode while she was driving her car.  Again 

involves only her left arm and it lasted about 30 minutes and then went away.  

Today, she was writing at her desk.  "I like to write letters a lot".  While she

was writing her letters, her left arm got heavier and heavier.  And as she sat 

there she realized that her left foot was also falling asleep and feeling numb. 

She denied amaurosis.  Palpitations were present only when she got here in the 

emergency room.  She felt like she was falling backwards.  Vision was slightly 

blurred and foggy but no actual loss of vision.  She has had a headache all this

week that has been getting gradually worse.





She was evaluated by Dr. Espinal.  Temperature was 36.6.  Pulse was 76.  She was 

sinus on telemetry.  Blood pressure 142/82.  Respirations 16 and she was 99% on 

room air.  Dr. Espinal did not find any focal neurological deficits.  CT of the 

head was negative for acute stroke, as was CT angiogram.  Her labs are overall 

normal.  Dr. Espinal is asking that this patient be placed in observation for TIA

work-up.





- HOSPITAL COURSE


Hospital Course: 


pt was admitted for TIA with left side weakness. pt's CTA of head, neck, MRI of 

brain, ECHO all show unremarkable. Patient had PT and OT evaluation and 

treatment, patient walk about 100 feet with good balance and strength. Patient 

was discharged as hemodynamic stable condition.  








- ALLERGIES


Allergies/Adverse Reactions: 


                                    Allergies











Allergy/AdvReac Type Severity Reaction Status Date / Time


 


No Known Drug Allergies Allergy   Verified 21 16:50














- MEDICATIONS


Home Medications: 


                                Ambulatory Orders











 Medication  Instructions  Recorded  Confirmed


 


Potassium Chloride [K-Dur] 20 meq PO BID 17


 


Meclizine [Antivert] 25 mg PO Q6H PRN #30 tablet 19


 


Ondansetron Odt [Zofran Odt] 4 mg TL Q6H PRN #10 tablet 19


 


Amlodipine Besylate [Norvasc] 10 mg PO DAILY 21


 


Atorvastatin [Lipitor] 20 mg ORAL DAILY 21


 


Calcium Carbonate [Calcium Antacid] 400 mg PO DAILY 21


 


Cholecalciferol [Vitamin D3] 2,000 unit PO DAILY 21


 


Triamterene/Hydrochlorothiazid 1.5 tab ORAL DAILY 21





[Maxzide 37.5 mg-25 mg Tablet]   


 


Aspirin [[place] Aspirin] 81 mg PO DAILY #30 tab.chew 21 














- PHYSICAL EXAM AT DISCHARGE


General Appearance: positive: No acute distress, Alert.  negative: Lethargic


Eyes Bilateral: positive: Normal inspection, PERRL, No lid inflammation


ENT: positive: ENT inspection nml, No signs of dehydration.  negative: Purulent 

nasal drainage


Neck: positive: Nml inspection, Trachea midline.  negative: Thyromegaly, 

Tracheal deviation


Respiratory: positive: Chest non-tender, No respiratory distress, Breath sounds 

nml.  negative: Wheezes, Rales


Cardiovascular: positive: Regular rate & rhythm, No murmur.  negative: T

achycardia, Bradycardia, Systolic murmur, Diastolic murmur


Peripheral Pulses: positive: 2+


Abdomen: positive: Non-tender, Nml bowel sounds, No distention.  negative: 

Tenderness


Back: positive: Nml inspection.  negative: CVA tenderness (R), CVA tenderness 

(L)


Skin: positive: Color nml, Warm, Dry.  negative: Cyanosis, Diaphoresis


Extremities: positive: Non-tender, Full ROM, Nml appearance.  negative: Calf 

tenderness


Neurologic/Psychiatric: positive: Oriented x3, Motor nml, Sensation nml, 

Mood/affect nml.  negative: Weakness, Sensory loss, Facial droop, Slurred/abnml 

speech, Depressed mood/affect





- LABS


Result Diagrams: 


                                 21 09:00





                                 21 09:00





- FOLLOW UP


Follow Up: 


Image study including MRI of your brain show you have no acute stroke. PT/OT 

evaluated and treated for you. You are prescribed walker, baby aspirin, and out-

pt PT/OT are recommended for you. CTA of neck found you have 0.9 cm left lobe 

thyroid nodule, you may followup with your PCP to monitor.


You may followup with your PCP in one to two weeks, followup with neurologist as

out-pt. Should your symptoms return or worsen, you may present ER or call 911  








- TIME SPENT


Time Spent in Discharge (Minutes): 30

## 2021-06-09 NOTE — MRI REPORT
PROCEDURE:  Brain W/O

 

INDICATIONS:  TIA

 

TECHNIQUE:  

Noncontrast axial T1 spin echo, axial T2 fast spin echo, sagittal and axial FLAIR, coronal T2 fast sp
in echo, axial gradient echo, axial diffusion and ADC through the brain.  

 

COMPARISON:  12/11/2019 MRI brain

 

FINDINGS:  

Image quality:  Excellent.  

 

CSF Spaces:  Basal cisterns are patent.  No extra-axial fluid collections.  Ventricles are normal in 
size and shape.  

 

Brain: Chronic microvascular ischemic changes and mild global cerebral volume loss are similar to the
 prior study. There is no restricted diffusion to indicate recent ischemia. The major intracranial va
scular flow related signal voids are maintained. No abnormal intracranial susceptibility. Midline str
uctures are normal in configuration.

 

Skull and face:  Calvarium has normal marrow signal.  Orbits appear normal.  

 

Sinuses: Moderate bilateral mastoid air cell effusions. Paranasal sinuses are predominantly clear.

 

IMPRESSION:  

No infarct or other acute intracranial abnormality.

 

Mild global cerebral volume loss and chronic microvascular ischemic changes, similar to 12/11/2019 co
mparison

 

Reviewed by: Nolan Contreras MD on 6/9/2021 9:14 AM PDT

Approved by: Nolan Contreras MD on 6/9/2021 9:14 AM PDT

 

 

Station ID:  [id number]

## 2021-06-09 NOTE — DISCHARGE PLAN
Discharge Plan


Problem Reviewed?: Yes


Disposition: 01 Home, Self Care


Condition: Stable


Prescriptions: 


Aspirin [[place] Aspirin] 81 mg PO DAILY #30 tab.chew


Diet: Regular


Activity Restrictions: Activity as Tolerated


Shower Restrictions: Yes (fall precaution)


Instruction Topics:  TIA, Atherosclerosis Aspirin


Health Concerns: 


Image study including MRI of your brain show you have no acute stroke. PT/OT 

evaluated and treated for you. You are prescribed walker, baby aspirin, and out-

pt PT/OT are recommended for you. CTA of neck found you have 0.9 cm left lobe 

thyroid nodule, you may followup with your PCP to monitor. 


Plan of Treatment: 


as the above


Care Goals: 


stabilization and improvement of your medical conditions


Assessment: 


discussed the care plan with you, answered your questions, you understood


Additional Instructions or Follow Up instructions: 


You may followup with your PCP in one to two weeks, followup with neurologist as

out-pt. Should your symptoms return or worsen, you may present ER or call 911 


Follow-Up Care: Outpatient Rehab - PT, Outpatient Rehab - OT


No Smoking: If you smoke, Please STOP!  Call [phone] for help.


Follow-up with: 


KONRAD EMERY MD [Primary Care Provider] -

## 2023-04-19 ENCOUNTER — HOSPITAL ENCOUNTER (OUTPATIENT)
Dept: HOSPITAL 76 - DI | Age: 73
Discharge: HOME | End: 2023-04-19
Attending: STUDENT IN AN ORGANIZED HEALTH CARE EDUCATION/TRAINING PROGRAM
Payer: MEDICARE

## 2023-04-19 DIAGNOSIS — M85.89: Primary | ICD-10-CM

## 2023-04-19 DIAGNOSIS — Z78.0: ICD-10-CM

## 2023-04-19 NOTE — DEXA REPORT
PROCEDURE: Dexa Spine and/or Hip

 

INDICATIONS: POST MENOPAUSAL

 

TECHNIQUE: Dual energy x-ray absorptiometry (DXA) was performed on a [employer] System.  Regions measur
ed are the AP Spine, femoral neck, and if needed forearm.

 

COMPARISON: None.

  

FINDINGS:

 

Lumbar Spine: 

Bone Mineral Density   1.007 g/cm/cm,T score  -1.4,

 

Left Femoral Neck:

Bone Mineral Density  0.716 g/cm/cm, T score -2.3,

 

Left Hip:

Bone Mineral Density  0.768 g/cm/cm,T score -1.9,

 

(T score greater or equal to -1.0: NORMAL)

(T score from -1.1 to -2.4: OSTEOPENIA)

(T score less than or equal to -2.5 to: OSTEOPOROSIS)

 

Impression: 

Osteopenia.

 

Patients with diagnosis of osteoporosis or osteopenia should have regular bone mineral density assess
ment.  For those eligible for Medicare, routine testing is allowed once every 2 years.  Testing frequ
ency can be increased for patients who have rapidly progressing disease or for those who are receivin
g medical therapy to restore bone mass.

 

Reviewed by: Juan Perez MD on 4/19/2023 5:48 PM PDT

Approved by: Juan Perez MD on 4/19/2023 5:48 PM PDT

 

 

Station ID:  [id number]

## 2024-03-21 ENCOUNTER — HOSPITAL ENCOUNTER (OUTPATIENT)
Dept: HOSPITAL 76 - SDS | Age: 74
Discharge: HOME | End: 2024-03-21
Attending: SURGERY
Payer: MEDICARE

## 2024-03-21 DIAGNOSIS — Z53.9: Primary | ICD-10-CM

## 2024-03-21 RX ADMIN — SODIUM CHLORIDE, POTASSIUM CHLORIDE, SODIUM LACTATE AND CALCIUM CHLORIDE ONE MLS: 600; 310; 30; 20 INJECTION, SOLUTION INTRAVENOUS at 07:25

## 2024-03-22 ENCOUNTER — HOSPITAL ENCOUNTER (OUTPATIENT)
Dept: HOSPITAL 76 - SDS | Age: 74
Discharge: HOME | End: 2024-03-22
Attending: SURGERY
Payer: MEDICARE

## 2024-03-22 VITALS — DIASTOLIC BLOOD PRESSURE: 82 MMHG | SYSTOLIC BLOOD PRESSURE: 148 MMHG | OXYGEN SATURATION: 99 %

## 2024-03-22 DIAGNOSIS — D12.0: ICD-10-CM

## 2024-03-22 DIAGNOSIS — Z12.11: Primary | ICD-10-CM

## 2024-03-22 PROCEDURE — 45385 COLONOSCOPY W/LESION REMOVAL: CPT

## 2024-03-22 PROCEDURE — 0DBH8ZZ EXCISION OF CECUM, VIA NATURAL OR ARTIFICIAL OPENING ENDOSCOPIC: ICD-10-PCS | Performed by: SURGERY

## 2024-03-22 RX ADMIN — SODIUM CHLORIDE, POTASSIUM CHLORIDE, SODIUM LACTATE AND CALCIUM CHLORIDE ONE MLS: 600; 310; 30; 20 INJECTION, SOLUTION INTRAVENOUS at 14:54

## 2024-03-22 NOTE — ANESTHESIA POST OP EVALUATION
Anesthesia Post Eval





- Post Anesthesia Eval


Vitals: 





                                Last Vital Signs











Temp  36.5 C   03/22/24 14:54


 


Pulse  78   03/22/24 15:16


 


Resp  14   03/22/24 15:16


 


BP  148/82 H  03/22/24 15:16


 


Pulse Ox  99   03/22/24 15:16


 


O2 Flow Rate      











CV Function Including HR & BP: Stable


Pain Control: Satisfactory


Nausea & Vomiting: Negative


Mental Status: Baseline


Respiratory Status: Airway Patent


Hydration Status: Satisfactory


Anesthesia Complications: None

## 2024-03-22 NOTE — HISTORY & PHYSICAL EXAMINATION
Chief Complaint





- Chief Complaint


Chief Complaint: here for colonoscopy





History of Present Illness





- History Obtained From


Records Reviewed: yes


History obtained from: pt


Exam Limitations: none





- History of Present Illness


HPI Comment/Other: 





history colon polyps 7 years ago





History





- Past Medical History


Cardiovascular: reports: Hypertension, High cholesterol


Respiratory: reports: None


Neuro: reports: TIA, Headaches, Other


Endocrine/Autoimmune: reports: None


GI: reports: GERD, Chronic diarrhea, Chronic constipation, Cholelithiasis


GYN: reports: Ovarian cysts, Other ()


: reports: None


HEENT: reports: Other


Psych: reports: Depression


Musculoskeletal: reports: Osteoarthritis, Osteoporosis


Derm: reports: Other


MRSA Hx?: No





- Past Surgical History


General: reports: Colonoscopy, EGD, Other


Ortho: reports: Rotator cuff repair


/GYN: reports: Tubal ligation, Hysterectomy, Oophrectomy, Other


HEENT: reports: Rhinoplasty





- Family & Social History


Family History Comment/Other: Her mom  at age 33 due to a heart attack.  But

she had antecedent valvular heart disease.  Probably rheumatic heart.  Dad  

at age 52 of a heart attack.  Of 5 siblings one is  of melanoma.  One 

sibling has diabetes.  The other siblings are healthy.  She denies a history of 

diabetes, hypertension, heart attack, stroke, cancer.  Her 2 children are 

completely healthy and have no medical illnesses.


Living Situation: With spouse/s.o.


Social History Notes: She was born in [place], then moved to [place] 

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  Then to 

[place].  She  [place]  [place]  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  Her   12 years ago.  She has been with a 

significant other for the last 11 years but not  to him.  She never 

smoked.  She has no history of alcohol abuse.  She has no history of 

recreational substance abuse.





- Substance History


Use: Uses substance without health or social issues: NONE





- POLST


Patient has POLST: No


POLST Status: Full Code





Meds/Allgy





- Home Medications


Home Medications: 


                                Ambulatory Orders











 Medication  Instructions  Recorded  Confirmed


 


Meclizine [Antivert] 25 mg PO Q6H PRN #30 tablet 19


 


Amlodipine Besylate [Norvasc] 10 mg PO DAILY 21


 


Calcium Carbonate [Calcium Antacid] 400 mg PO DAILY 21


 


Cholecalciferol [Vitamin D3] 2,000 unit PO DAILY 21


 


Aspirin [[place] Aspirin] 81 mg PO DAILY #30 tab.chew 21


 


Linaclotide [Linzess] 72 mcg PO DAILY 24














- Allergies


Allergies/Adverse Reactions: 


                                    Allergies











Allergy/AdvReac Type Severity Reaction Status Date / Time


 


No Known Drug Allergies Allergy   Verified 21 16:50














Review of Systems





- Other Findings


Other Findings: 





10 pt ros as above otherwise unremarkable





Exam





- Vital Signs


Vital Signs: 





                                Vital Signs x48h











  Temp Pulse Resp BP Pulse Ox


 


 24 12:40  36.8 C  81  17  151/72 H  96














- Physical Exam


General Appearance: positive: No acute distress, Alert


Eyes Bilateral: positive: PERRL


ENT: positive: No signs of dehydration


Neck: positive: No JVD, Trachea midline


Respiratory: positive: No respiratory distress


Cardiovascular: positive: Regular rate & rhythm


Abdomen: positive: No distention


Neurologic/Psychiatric: positive: Oriented x3





Conclusion/Plan





- Problem List


(1) Colon cancer screening


Conclusion/Plan: 


plan colonoscopy.  parq held and consent obtained

## 2024-03-22 NOTE — ANESTHESIA
Pre-Anesthesia VS, & Labs





- Diagnosis





history of polyps





- Procedure





colonoscopy


Vital Signs: 





                                        











Temp Pulse Resp BP Pulse Ox O2 Flow Rate


 


 36.8 C   81   17   151/72 H  96    


 


 03/22/24 12:40  03/22/24 12:40  03/22/24 12:40  03/22/24 12:40  03/22/24 12:40 

 











Height: 5 ft 10 in


Weight (kg): 80.7 kg


Body Mass Index: 25.5


BMI Classification: Overweight





- NPO


Other (prep as directed)





- Pregnancy


Is Patient Pregnant?: No





Home Medications and Allergies





                                        





Amlodipine Besylate [Norvasc] 10 mg PO DAILY 06/08/21 


Calcium Carbonate [Calcium Antacid] 400 mg PO DAILY 06/08/21 


Cholecalciferol [Vitamin D3] 2,000 unit PO DAILY 06/08/21 


Linaclotide [Linzess] 72 mcg PO DAILY 03/20/24 








Allergies/Adverse Reactions: 


                                    Allergies











Allergy/AdvReac Type Severity Reaction Status Date / Time


 


No Known Drug Allergies Allergy   Verified 06/08/21 16:50














Anes History & Medical History





- Anesthetic History


Anesthesia Complications: reports: No previous complications





- Medical History


Cardiovascular: reports: Hypertension, High cholesterol


Pulmonary: reports: None


Gastrointestinal: reports: GERD, Chronic diarrhea, Chronic constipation, 

Cholelithiasis


Urinary: reports: None


Neuro: reports: TIA, Headaches, Other


Musculoskeletal: reports: Osteoarthritis, Osteoporosis


Endocrine/Autoimmune: reports: None


Blood Disorders: reports: None


Skin: reports: Other


Smoking Status: Never smoker





- Surgical History


General: reports: Colonoscopy, EGD, Other


Eyes Ears Nose Throat (EENT): reports: Rhinoplasty


Gynecologic: reports: Tubal ligation, Hysterectomy, Oophrectomy, Other


Orthopedic: reports: Rotator cuff repair





Exam


General: Alert, Oriented x3


Dental: WNL


Mouth Opening: Greater than 4 Fingerbreadths


Neck Mobility: Normal


Mallampati classification: II


Thyromental Distance: greater than 6 cm


Respiratory: Lungs clear


Cardiovascular: Regular rate





Plan


Anesthesia Type: Total IV


Consent for Procedure(s) Verified and Reviewed: Yes


Code Status: Attempt Resuscitation


ASA classification: 2-Mild systemic disease


Is this case an emergency?: No